# Patient Record
Sex: MALE | Race: WHITE | NOT HISPANIC OR LATINO | Employment: FULL TIME | URBAN - METROPOLITAN AREA
[De-identification: names, ages, dates, MRNs, and addresses within clinical notes are randomized per-mention and may not be internally consistent; named-entity substitution may affect disease eponyms.]

---

## 2018-05-08 ENCOUNTER — OFFICE VISIT CONVERTED (OUTPATIENT)
Dept: FAMILY MEDICINE CLINIC | Facility: CLINIC | Age: 38
End: 2018-05-08
Attending: NURSE PRACTITIONER

## 2018-06-22 ENCOUNTER — OFFICE VISIT CONVERTED (OUTPATIENT)
Dept: FAMILY MEDICINE CLINIC | Facility: CLINIC | Age: 38
End: 2018-06-22
Attending: PHYSICIAN ASSISTANT

## 2018-07-09 ENCOUNTER — CONVERSION ENCOUNTER (OUTPATIENT)
Dept: FAMILY MEDICINE CLINIC | Facility: CLINIC | Age: 38
End: 2018-07-09

## 2018-07-09 ENCOUNTER — OFFICE VISIT CONVERTED (OUTPATIENT)
Dept: FAMILY MEDICINE CLINIC | Facility: CLINIC | Age: 38
End: 2018-07-09
Attending: NURSE PRACTITIONER

## 2019-03-19 ENCOUNTER — OFFICE VISIT CONVERTED (OUTPATIENT)
Dept: FAMILY MEDICINE CLINIC | Facility: CLINIC | Age: 39
End: 2019-03-19
Attending: NURSE PRACTITIONER

## 2019-03-19 ENCOUNTER — CONVERSION ENCOUNTER (OUTPATIENT)
Dept: FAMILY MEDICINE CLINIC | Facility: CLINIC | Age: 39
End: 2019-03-19

## 2019-06-17 ENCOUNTER — OFFICE VISIT CONVERTED (OUTPATIENT)
Dept: FAMILY MEDICINE CLINIC | Facility: CLINIC | Age: 39
End: 2019-06-17
Attending: NURSE PRACTITIONER

## 2019-06-17 ENCOUNTER — HOSPITAL ENCOUNTER (OUTPATIENT)
Dept: LAB | Facility: HOSPITAL | Age: 39
Discharge: HOME | End: 2019-06-17
Attending: NURSE PRACTITIONER

## 2019-06-17 LAB
ALBUMIN SERPL-MCNC: 4.8 G/DL (ref 3.5–5)
ALBUMIN/GLOB SERPL: 1.6 {RATIO} (ref 1.4–2.6)
ALP SERPL-CCNC: 80 U/L (ref 53–128)
ALT SERPL-CCNC: 15 U/L (ref 10–40)
ANION GAP SERPL CALC-SCNC: 23 MMOL/L (ref 8–19)
AST SERPL-CCNC: 18 U/L (ref 15–50)
BASOPHILS # BLD AUTO: 0.07 10*3/UL (ref 0–0.2)
BASOPHILS NFR BLD AUTO: 0.8 % (ref 0–3)
BILIRUB SERPL-MCNC: 0.44 MG/DL (ref 0.2–1.3)
BUN SERPL-MCNC: 18 MG/DL (ref 5–25)
BUN/CREAT SERPL: 15 {RATIO} (ref 6–20)
CALCIUM SERPL-MCNC: 10 MG/DL (ref 8.7–10.4)
CHLORIDE SERPL-SCNC: 104 MMOL/L (ref 99–111)
CHOLEST SERPL-MCNC: 191 MG/DL (ref 107–200)
CHOLEST/HDLC SERPL: 5.2 {RATIO} (ref 3–6)
CONV ABS IMM GRAN: 0.02 10*3/UL (ref 0–0.2)
CONV CO2: 22 MMOL/L (ref 22–32)
CONV IMMATURE GRAN: 0.2 % (ref 0–1.8)
CONV TOTAL PROTEIN: 7.8 G/DL (ref 6.3–8.2)
CREAT UR-MCNC: 1.22 MG/DL (ref 0.7–1.2)
DEPRECATED RDW RBC AUTO: 45.9 FL (ref 35.1–43.9)
EOSINOPHIL # BLD AUTO: 0.81 10*3/UL (ref 0–0.7)
EOSINOPHIL # BLD AUTO: 8.9 % (ref 0–7)
ERYTHROCYTE [DISTWIDTH] IN BLOOD BY AUTOMATED COUNT: 13.6 % (ref 11.6–14.4)
GFR SERPLBLD BASED ON 1.73 SQ M-ARVRAT: >60 ML/MIN/{1.73_M2}
GLOBULIN UR ELPH-MCNC: 3 G/DL (ref 2–3.5)
GLUCOSE SERPL-MCNC: 94 MG/DL (ref 70–99)
HBA1C MFR BLD: 15.8 G/DL (ref 14–18)
HCT VFR BLD AUTO: 48.5 % (ref 42–52)
HDLC SERPL-MCNC: 37 MG/DL (ref 40–60)
LDLC SERPL CALC-MCNC: 136 MG/DL (ref 70–100)
LYMPHOCYTES # BLD AUTO: 2 10*3/UL (ref 1–5)
MCH RBC QN AUTO: 29.8 PG (ref 27–31)
MCHC RBC AUTO-ENTMCNC: 32.6 G/DL (ref 33–37)
MCV RBC AUTO: 91.3 FL (ref 80–96)
MONOCYTES # BLD AUTO: 0.7 10*3/UL (ref 0.2–1.2)
MONOCYTES NFR BLD AUTO: 7.7 % (ref 3–10)
NEUTROPHILS # BLD AUTO: 5.5 10*3/UL (ref 2–8)
NEUTROPHILS NFR BLD AUTO: 60.4 % (ref 30–85)
NRBC CBCN: 0 % (ref 0–0.7)
OSMOLALITY SERPL CALC.SUM OF ELEC: 300 MOSM/KG (ref 273–304)
PLATELET # BLD AUTO: 275 10*3/UL (ref 130–400)
PMV BLD AUTO: 10.9 FL (ref 9.4–12.4)
POTASSIUM SERPL-SCNC: 4.8 MMOL/L (ref 3.5–5.3)
PSA SERPL-MCNC: 0.57 NG/ML (ref 0–4)
RBC # BLD AUTO: 5.31 10*6/UL (ref 4.7–6.1)
SODIUM SERPL-SCNC: 144 MMOL/L (ref 135–147)
TESTOST SERPL-MCNC: 394 NG/DL (ref 249–836)
TRIGL SERPL-MCNC: 91 MG/DL (ref 40–150)
VARIANT LYMPHS NFR BLD MANUAL: 22 % (ref 20–45)
VLDLC SERPL-MCNC: 18 MG/DL (ref 5–37)
WBC # BLD AUTO: 9.1 10*3/UL (ref 4.8–10.8)

## 2019-06-19 LAB — TESTOSTERONE, FREE: 4.1 PG/ML (ref 8.7–25.1)

## 2020-01-23 ENCOUNTER — OFFICE VISIT CONVERTED (OUTPATIENT)
Dept: FAMILY MEDICINE CLINIC | Facility: CLINIC | Age: 40
End: 2020-01-23
Attending: NURSE PRACTITIONER

## 2020-01-23 ENCOUNTER — HOSPITAL ENCOUNTER (OUTPATIENT)
Dept: LAB | Facility: HOSPITAL | Age: 40
Discharge: HOME | End: 2020-01-23
Attending: NURSE PRACTITIONER

## 2020-01-23 ENCOUNTER — HOSPITAL ENCOUNTER (OUTPATIENT)
Dept: GENERAL RADIOLOGY | Facility: HOSPITAL | Age: 40
Discharge: HOME | End: 2020-01-23
Attending: NURSE PRACTITIONER

## 2020-01-23 LAB
ALBUMIN SERPL-MCNC: 4.8 G/DL (ref 3.5–5)
ALBUMIN/GLOB SERPL: 1.7 {RATIO} (ref 1.4–2.6)
ALP SERPL-CCNC: 77 U/L (ref 53–128)
ALT SERPL-CCNC: 20 U/L (ref 10–40)
ANION GAP SERPL CALC-SCNC: 21 MMOL/L (ref 8–19)
AST SERPL-CCNC: 21 U/L (ref 15–50)
BASOPHILS # BLD AUTO: 0.09 10*3/UL (ref 0–0.2)
BASOPHILS NFR BLD AUTO: 0.7 % (ref 0–3)
BILIRUB SERPL-MCNC: 0.7 MG/DL (ref 0.2–1.3)
BUN SERPL-MCNC: 15 MG/DL (ref 5–25)
BUN/CREAT SERPL: 16 {RATIO} (ref 6–20)
CALCIUM SERPL-MCNC: 10.1 MG/DL (ref 8.7–10.4)
CHLORIDE SERPL-SCNC: 97 MMOL/L (ref 99–111)
CHOLEST SERPL-MCNC: 196 MG/DL (ref 107–200)
CHOLEST/HDLC SERPL: 5 {RATIO} (ref 3–6)
CONV ABS IMM GRAN: 0.04 10*3/UL (ref 0–0.2)
CONV CO2: 25 MMOL/L (ref 22–32)
CONV IMMATURE GRAN: 0.3 % (ref 0–1.8)
CONV TOTAL PROTEIN: 7.7 G/DL (ref 6.3–8.2)
CREAT UR-MCNC: 0.96 MG/DL (ref 0.7–1.2)
DEPRECATED RDW RBC AUTO: 42.9 FL (ref 35.1–43.9)
EOSINOPHIL # BLD AUTO: 0.71 10*3/UL (ref 0–0.7)
EOSINOPHIL # BLD AUTO: 5.8 % (ref 0–7)
ERYTHROCYTE [DISTWIDTH] IN BLOOD BY AUTOMATED COUNT: 12.9 % (ref 11.6–14.4)
FERRITIN SERPL-MCNC: 114 NG/ML (ref 30–300)
FOLATE SERPL-MCNC: 12.1 NG/ML (ref 4.8–20)
GFR SERPLBLD BASED ON 1.73 SQ M-ARVRAT: >60 ML/MIN/{1.73_M2}
GLOBULIN UR ELPH-MCNC: 2.9 G/DL (ref 2–3.5)
GLUCOSE SERPL-MCNC: 82 MG/DL (ref 70–99)
HCT VFR BLD AUTO: 49 % (ref 42–52)
HDLC SERPL-MCNC: 39 MG/DL (ref 40–60)
HGB BLD-MCNC: 15.8 G/DL (ref 14–18)
IRON SATN MFR SERPL: 36 % (ref 20–55)
IRON SERPL-MCNC: 129 UG/DL (ref 70–180)
LDLC SERPL CALC-MCNC: 137 MG/DL (ref 70–100)
LYMPHOCYTES # BLD AUTO: 2.55 10*3/UL (ref 1–5)
LYMPHOCYTES NFR BLD AUTO: 20.7 % (ref 20–45)
MCH RBC QN AUTO: 29.3 PG (ref 27–31)
MCHC RBC AUTO-ENTMCNC: 32.2 G/DL (ref 33–37)
MCV RBC AUTO: 90.7 FL (ref 80–96)
MONOCYTES # BLD AUTO: 0.96 10*3/UL (ref 0.2–1.2)
MONOCYTES NFR BLD AUTO: 7.8 % (ref 3–10)
NEUTROPHILS # BLD AUTO: 7.97 10*3/UL (ref 2–8)
NEUTROPHILS NFR BLD AUTO: 64.7 % (ref 30–85)
NRBC CBCN: 0 % (ref 0–0.7)
OSMOLALITY SERPL CALC.SUM OF ELEC: 286 MOSM/KG (ref 273–304)
PLATELET # BLD AUTO: 321 10*3/UL (ref 130–400)
PMV BLD AUTO: 10.6 FL (ref 9.4–12.4)
POTASSIUM SERPL-SCNC: 4.9 MMOL/L (ref 3.5–5.3)
RBC # BLD AUTO: 5.4 10*6/UL (ref 4.7–6.1)
SODIUM SERPL-SCNC: 138 MMOL/L (ref 135–147)
T4 FREE SERPL-MCNC: 1.3 NG/DL (ref 0.9–1.8)
TIBC SERPL-MCNC: 355 UG/DL (ref 245–450)
TRANSFERRIN SERPL-MCNC: 248 MG/DL (ref 215–365)
TRIGL SERPL-MCNC: 102 MG/DL (ref 40–150)
TSH SERPL-ACNC: 1.05 M[IU]/L (ref 0.27–4.2)
VIT B12 SERPL-MCNC: 727 PG/ML (ref 211–911)
VLDLC SERPL-MCNC: 20 MG/DL (ref 5–37)
WBC # BLD AUTO: 12.32 10*3/UL (ref 4.8–10.8)

## 2020-01-31 ENCOUNTER — OFFICE VISIT CONVERTED (OUTPATIENT)
Dept: FAMILY MEDICINE CLINIC | Facility: CLINIC | Age: 40
End: 2020-01-31
Attending: NURSE PRACTITIONER

## 2020-10-28 ENCOUNTER — OFFICE VISIT CONVERTED (OUTPATIENT)
Dept: PODIATRY | Facility: CLINIC | Age: 40
End: 2020-10-28
Attending: PODIATRIST

## 2020-10-28 ENCOUNTER — HOSPITAL ENCOUNTER (OUTPATIENT)
Dept: GENERAL RADIOLOGY | Facility: HOSPITAL | Age: 40
Discharge: HOME | End: 2020-10-28
Attending: PODIATRIST

## 2020-11-02 ENCOUNTER — HOSPITAL ENCOUNTER (OUTPATIENT)
Dept: PREADMISSION TESTING | Facility: HOSPITAL | Age: 40
Discharge: HOME | End: 2020-11-02
Attending: PODIATRIST

## 2020-11-03 LAB — SARS-COV-2 RNA SPEC QL NAA+PROBE: NOT DETECTED

## 2020-11-06 ENCOUNTER — HOSPITAL ENCOUNTER (OUTPATIENT)
Dept: PERIOP | Facility: HOSPITAL | Age: 40
Setting detail: HOSPITAL OUTPATIENT SURGERY
Discharge: HOME | End: 2020-11-06
Attending: PODIATRIST

## 2020-11-10 ENCOUNTER — OFFICE VISIT CONVERTED (OUTPATIENT)
Dept: PODIATRY | Facility: CLINIC | Age: 40
End: 2020-11-10
Attending: PODIATRIST

## 2020-11-10 ENCOUNTER — HOSPITAL ENCOUNTER (OUTPATIENT)
Dept: GENERAL RADIOLOGY | Facility: HOSPITAL | Age: 40
Discharge: HOME | End: 2020-11-10
Attending: PODIATRIST

## 2020-11-17 ENCOUNTER — OFFICE VISIT CONVERTED (OUTPATIENT)
Dept: PODIATRY | Facility: CLINIC | Age: 40
End: 2020-11-17
Attending: PODIATRIST

## 2021-02-03 ENCOUNTER — HOSPITAL ENCOUNTER (OUTPATIENT)
Dept: URGENT CARE | Facility: CLINIC | Age: 41
Discharge: HOME | End: 2021-02-03
Attending: FAMILY MEDICINE

## 2021-02-04 LAB — SARS-COV-2 RNA SPEC QL NAA+PROBE: NOT DETECTED

## 2021-05-10 NOTE — H&P
"   History and Physical      Patient Name: Asad Sutton   Patient ID: [id number]   Sex: Male   YOB: 1980    Primary Care Provider: Lalitha LYLES   Referring Provider: Lalitha LYLES    Visit Date: October 28, 2020    Provider: Lino Brownlee DPM   Location: [facility]   Location Address: [address] [place] [address]  [id number]   Location Phone: [phone]          Chief Complaint  · Left Foot Pain  · Surgical History and Physical      History Of Present Illness  Asad Sutton is a 40 year old /White male who presents to the [facility] today new patient referred from Lalitha LYLES.   Patient Name: Asad Sutton   Allergies: PENICILLINS   Cheif Complaint/HPI: Left bunion deformity of great toe.   Current Medicaitons: no medications   Past Medical History: Aftercare following surgery, Anxiety, Athletes foot, Bunion of left foot, Corns and callus, Depression, Foot pain, left, Hammertoe, Heel pain, Hypertension, Ingrown toenail, Numbness in feet, and Plantar wart   Relevent Social History: Smoker   Tobacco Use: Currently is a smoker : discussed with patient smoking cessation and risk of delayed healing with continued smoking habits.   Psychological History: Within Normal Limits   HPI     5 years  Onset: Insidious  Nature: Sore, achy  Stable, worsening, improving: Slowly worsening  Aggravating factors:  Patient relates pain is aggravated by shoe gear and ambulation.    Previous Treatment: Changes in shoe gear and activities.  Patient was scheduled for a left bunion correction with the [facility] but due to them perform the surgery in [place] the patient presents to my office for evaluation of possible surgery of his left bunion.    Patient denies any fevers, chills, nausea, vomiting, shortness of breathe, nor any other constitutional signs nor symptoms.    Patient states he works as a  from [employer]\">New, " Established, New Problem: New  Location: Left bunion deformity  Duration:  > 5 years  Onset: Insidious  Nature: Sore, achy  Stable, worsening, improving: Slowly worsening  Aggravating factors:  Patient relates pain is aggravated by shoe gear and ambulation.    Previous Treatment: Changes in shoe gear and activities.  Patient was scheduled for a left bunion correction with the [facility] but due to them perform the surgery in [place] the patient presents to my office for evaluation of possible surgery of his left bunion.    Patient denies any fevers, chills, nausea, vomiting, shortness of breathe, nor any other constitutional signs nor symptoms.    Patient states he works as a  from [employer].       Past Medical History  Aftercare following surgery; Anxiety; Athletes foot; Bunion of left foot; Corns and callus; Depression; Foot pain, left; Hammertoe; Heel pain; Hypertension; Ingrown toenail; Numbness in feet; Plantar wart         Past Surgical History  *No Past Surgical History         Allergy List  PENICILLINS       Allergies Reconciled  Family Medical History  Breast Neoplasm, Malignant; Hypertension; Diabetes Mellitus, Type II         Social History  Alcohol (Never); Heavy Amount of Exercise (4 or more times weekly); No known infection risk; Single; Tobacco (Current every day)         Immunizations  Name Date Admin   Tdap 10/24/2017         Review of Systems  · Constitutional  o Denies  o : fatigue, night sweats  · Eyes  o Denies  o : double vision, blurred vision  · HENT  o Denies  o : vertigo, recent head injury  · Cardiovascular  o Denies  o : chest pain, irregular heart beats  · Respiratory  o Denies  o : shortness of breath, productive cough  · Gastrointestinal  o Denies  o : nausea, vomiting  · Genitourinary  o Denies  o : dysuria, urinary retention  · Integument  o Denies  o : hair growth change, new skin lesions  · Neurologic  o Denies  o : altered mental status,  "seizures  · Musculoskeletal  o * See HPI  · Endocrine  o Denies  o : cold intolerance, heat intolerance  · Heme-Lymph  o Denies  o : petechiae, lymph node enlargement or tenderness  · Allergic-Immunologic  o Denies  o : frequent illnesses      Vitals  Date Time BP Position Site L\R Cuff Size HR RR TEMP (F) WT  HT  BMI kg/m2 BSA m2 O2 Sat FR L/min FiO2 HC       10/28/2020 10:15 /87 Sitting    67 - R  97.8 211lbs 6oz 6'  6\" 24.43 2.3 100 %            Physical Examination  · Constitutional  o Appearance  o : well developed, well-nourished, no obvious deformities present  · Cardiovascular  o Peripheral Vascular System  o :   § Pedal Pulses  § : pulses 2 + and symmetrical  § Extremities  § : no edema in lower extremities  · Musculoskeletal  o General  o :   § General Musculoskeletal  § : Lower extremity muscle and strength and range of motion is equal and symmetrical bilaterally. The knees are noted to be normal in alignment. Left medial deviation of the first metatarsal with associated lateral deviation of the hallux at the metatarsal phalangeal joint. The right first metatarsophalangeal joint is painful with range of motion. No signs of edema, erythema, lymphangitis, nor signs of infection.   · Skin and Subcutaneous Tissue  o General Inspection  o : Skin is noted to have normal texture and turgor, with no excrescences noted.   o Digits and Nails  o : The toenails are noted to be without disese.  · Neurologic  o Sensation  o : Epicritic sensations intact bilaterally.     Reviewed charts from [facility].           Assessment  · Foot pain, left     729.5/M79.672  · Hallux abducto valgus, left     735.0/M20.12  · Preoperative examination     V72.84/Z01.818      Plan  · Orders  o AMIE Report (KASPR) - - 10/28/2020  o Foot (Left) 3 or more views X-Ray [facility] Preferred View ([id number]) - - 10/28/2020  o [facility] Pre-Op Covid-19 Screening (82710) - - 10/28/2020  o Harjeet Mosqueda ([id number]) - - " 10/28/2020  o [place] bunionectomy of left great toe with insertion of screw (68329) - - 10/28/2020  · Medications  o Medications have been Reconciled  o Transition of Care or Provider Policy  · Instructions  o PLAN: Corry Morse bunionectomy and modified Harjeet bunionectomy.  o Handouts Provided-Pre-Procedure Instructions including date and time and location of procedure.  o Surgical Facility: [facility]  o ****Surgical Orders****  o ****Patient Status****  o Outpatient  o ********************  o RISK AND BENEFITS:  o Consent for surgery: Given these options, the patient has verbally expressed an understanding of the risks of surgery and finds these risks acceptable. We will proceed with surgery as soon as possible.  o Consult Anesthesia for an post-operative block, or any pain management procedure deemed necessary by the anestesiologist for adequate post-operative pain control.   o O.R. PREP: Per protocol  o IV: Per Anesthesia  o IV: LR@ 75ml/hr  o PLEASE SIGN PERMIT FOR: Left great toe bunion correction  o *******************************************  o PRE- OP MEDICATION ORDER:  o *******************************************  o *__Clindamycin 600 mg IV on call to OR.  o *___The above History and Physical Examination has been completed within 30 days of admission.  o Pre-Admission Testing Date:   o Follow up post surgery in 4 days.  o Discuss Findings: I have discussed the findings of this evaluation with the patient. The discussion included a complete verbal explanation of any changes in the examination results, diagnosis, and the current treatment plan. A schedule for future care needs was explained. If any questions should arise after returning home, I have encouraged the patient to feel free to contact Dr. Brownlee. The patient states understanding and agreement with this plan.  o Advised smoking cessation and discussed techniques to quit (patch, pill, etc), (3 - 10 minutes). I encouraged  the patient to discuss steps to begin process with their primary care provider.  o Procedure: Left Reverdin Green [place] bunionectomy and modified [place] bunionectomy. Upon discussion of non-surgical conservative option, surgical correction, post-operative requirements along with risk and benefits of the surgery along with expected outcomes, the patient states they would like to proceed with the scheduling surgery.  o Patient to monitor for recurrence of symptoms and to contact Dr. Abreu office for a follow-up appointment.  o The risks and benefits of the surgery were discussed with the patient. This discussion included possible complications of requiring further surgery, possible delayed wound healing, further surgery requiring amputation of the foot or leg, and also included the complication of death. All the patient's questions were answered to their satisfaction. Patient states they would like to proceed with the procedure.   o Local with MAC. Supine. [place] 28 screw set.   o Electronically Identified Patient Education Materials Provided Electronically  · Disposition  o Call or Return if symptoms worsen or persist.            Electronically Signed by: Lino Brownlee DPM -Author on October 28, 2020 11:25:19 AM

## 2021-05-13 NOTE — PROGRESS NOTES
Progress Note      Patient Name: Asad Sutton   Patient ID: [id number]   Sex: Male   YOB: 1980    Primary Care Provider: Lalitha LYLES   Referring Provider: Lalitha LYLES    Visit Date: November 10, 2020    Provider: Lino Brownlee DPM   Location: [facility] [place] [place] [facility]   Location Address: [address] [place] [address]  [id number]   Location Phone: [phone]          Chief Complaint  · Follow Up Office Visit S/P Surgery      History Of Present Illness  Asad Sutton is a 40 year old /White male who presents today for a postoperative visit.      Procedure:  Left Reverdin Green [place] and modified Cosby bunionectomies  Date:  06 October 2020      Patient states he tripped at home and fell and landed on his surgical foot.  Otherwise, patient states they are doing well without complications.  Patient states they are following post-op instructions.  Patient states pain is controlled.      Patient denies any fevers, chills, nausea, vomiting, shortness of breathe, nor any other constitutional signs nor symptoms.                 Past Medical History  Aftercare following surgery; Anxiety; Athletes foot; Bunion of left foot; Corns and callus; Depression; Foot pain, left; Hammertoe; Heel pain; Hypertension; Ingrown toenail; Numbness in feet; Plantar wart         Past Surgical History  Bunionectomy         Allergy List  PENICILLINS       Allergies Reconciled  Family Medical History  Breast Neoplasm, Malignant; Hypertension; Diabetes Mellitus, Type II         Social History  Alcohol (Never); Heavy Amount of Exercise (4 or more times weekly); No known infection risk; Single; Tobacco (Current every day)         Immunizations  Name Date Admin   Tdap 10/24/2017         Review of Systems  · Constitutional  o Denies  o : fatigue, night sweats  · Eyes  o Denies  o : double vision, blurred vision  · HENT  o Denies  o : vertigo, recent head injury  · Cardiovascular  o Denies  o : chest  "pain, irregular heart beats  · Respiratory  o Denies  o : shortness of breath, productive cough  · Gastrointestinal  o Denies  o : nausea, vomiting  · Genitourinary  o Denies  o : dysuria, urinary retention  · Integument  o * See HPI  · Neurologic  o Denies  o : altered mental status, seizures  · Musculoskeletal  o * See HPI  · Endocrine  o Denies  o : cold intolerance, heat intolerance  · Heme-Lymph  o Denies  o : petechiae, lymph node enlargement or tenderness  · Allergic-Immunologic  o Denies  o : frequent illnesses      Vitals  Date Time BP Position Site L\R Cuff Size HR RR TEMP (F) WT  HT  BMI kg/m2 BSA m2 O2 Sat FR L/min FiO2 HC       11/10/2020 10:12 /80 Sitting                 11/10/2020 10:12 /94 Sitting    77 - R  97.6  6'  6\"   100 %            Physical Examination  · Constitutional  o Appearance  o : well developed, well-nourished, no obvious deformities present  · Cardiovascular  o Peripheral Vascular System  o :   § Pedal Pulses  § : pulses 2 + and symmetrical  § Extremities  § : no edema in lower extremities  · Skin and Subcutaneous Tissue  o General Inspection  o : Skin is noted to have normal texture and turgor, with no excrescences noted.   o Digits and Nails  o : The toenails are noted to be without disese.  · Neurologic  o Sensation  o : Epicritic sensations intact bilaterally.  · Left Ankle/Foot  o Inspection  o : Dressing is dry and intact without signs of breakthrough. Sutures intact with skin edges well-coapted with no signs of dehiscence. Healthy surgical skin edges. No drainage present. No edema, erythema, calor, lymphangitis, nor signs of infection seen. Left great toe is in rectus position.     Dr. Brownlee reviewed radiographs and results from [facility] and discussed them with the patient.  Radiographs show distal 1st metatarsal shaft osteotomy which shows a bunionectomy with good post-operative position with single screw fixation. No displacement seen of original " postop position of the capital fragment on the left first metatarsal shaft.  No osteolytic changes seen surrounding screw placement. No other changes seen compared to previous views.           Assessment  · Postoperative Exam Following Surgery     V67.00      Plan  · Medications  o Medications have been Reconciled  o Transition of Care or Provider Policy  · Instructions  o Patient not weight bear to the affected foot at this time. Patient states understanding and agreement with this plan.  o Patient to monitor for recurrence of symptoms and to contact Dr. Abreu office for a follow-up appointment.  o 2nd Post-op visit, X-rays, Suture Removal, PWB with surgical shoe.  o Electronically Identified Patient Education Materials Provided Electronically  · Disposition  o Call or Return if symptoms worsen or persist.            Electronically Signed by: Lino Brownlee DPM -Author on November 10, 2020 10:30:30 AM

## 2021-05-13 NOTE — PROGRESS NOTES
Progress Note      Patient Name: Asad Sutton   Patient ID: [id number]   Sex: Male   YOB: 1980    Primary Care Provider: Lalitha LYLES   Referring Provider: Lalitha LYLES    Visit Date: November 17, 2020    Provider: Lino Brownlee DPM   Location: [facility]   Location Address: [address] [place] [address]  [id number]   Location Phone: [phone]          Chief Complaint  · Follow Up Office Visit S/P Surgery      History Of Present Illness  Asad Sutton is a 40 year old /White male who presents today for a postoperative visit.      Procedure:  Left Reverdin Green [place] and modified Cosby bunionectomies  Date:  06 October 2020      Patient states they are doing well without complications.  Patient states they are following post-op instructions.  Patient states pain is controlled.      Patient denies any fevers, chills, nausea, vomiting, shortness of breathe, nor any other constitutional signs nor symptoms.      Patient relates no medical changes since their last visit.           Past Medical History  Aftercare following surgery; Anxiety; Athletes foot; Bunion of left foot; Corns and callus; Depression; Foot pain, left; Hammertoe; Heel pain; Hypertension; Ingrown toenail; Numbness in feet; Plantar wart         Past Surgical History  Bunionectomy         Allergy List  PENICILLINS       Allergies Reconciled  Family Medical History  Breast Neoplasm, Malignant; Hypertension; Diabetes Mellitus, Type II         Social History  Alcohol (Never); Heavy Amount of Exercise (4 or more times weekly); No known infection risk; Single; Tobacco (Current every day)         Immunizations  Name Date Admin   Tdap 10/24/2017         Review of Systems  · Constitutional  o Denies  o : fatigue, night sweats  · Eyes  o Denies  o : double vision, blurred vision  · HENT  o Denies  o : vertigo, recent head injury  · Cardiovascular  o Denies  o : chest pain, irregular heart  "beats  · Respiratory  o Denies  o : shortness of breath, productive cough  · Gastrointestinal  o Denies  o : nausea, vomiting  · Genitourinary  o Denies  o : dysuria, urinary retention  · Integument  o * See HPI  · Neurologic  o Denies  o : altered mental status, seizures  · Musculoskeletal  o * See HPI  · Endocrine  o Denies  o : cold intolerance, heat intolerance  · Heme-Lymph  o Denies  o : petechiae, lymph node enlargement or tenderness  · Allergic-Immunologic  o Denies  o : frequent illnesses      Vitals  Date Time BP Position Site L\R Cuff Size HR RR TEMP (F) WT  HT  BMI kg/m2 BSA m2 O2 Sat FR L/min FiO2        11/17/2020 09:54 /78 Sitting    84 - R  97.6  6'  6\"   100 %            Physical Examination  · Constitutional  o Appearance  o : well developed, well-nourished, no obvious deformities present  · Cardiovascular  o Peripheral Vascular System  o :   § Pedal Pulses  § : pulses 2 + and symmetrical  § Extremities  § : no edema in lower extremities  · Skin and Subcutaneous Tissue  o General Inspection  o : Skin is noted to have normal texture and turgor, with no excrescences noted.   o Digits and Nails  o : The toenails are noted to be without disese.  · Neurologic  o Sensation  o : Epicritic sensations intact bilaterally.  · Left Ankle/Foot  o Inspection  o : Dressing is dry and intact without signs of breakthrough. Sutures intact with skin edges well-coapted with no signs of dehiscence. Healthy surgical skin edges. No drainage present. No edema, erythema, calor, lymphangitis, nor signs of infection seen. Left great toe is in rectus position. Upon removal of sutures, skin edges remain well-coapted with no signs of dehiscence.     Dr. Brownlee reviewed radiographs and results from [facility] and discussed them with the patient.  Radiographs show distal 1st metatarsal shaft osteotomy which shows a bunionectomy with good post-operative position with single screw fixation.  No osteolytic " changes seen surrounding screw placement.  Early trabeculation seen across osteotomy site.   No other changes seen compared to previous views.           Assessment  · Postoperative Exam Following Surgery     V67.00      Plan  · Medications  o Medications have been Reconciled  o Transition of Care or Provider Policy  · Instructions  o Patient not weight bear to the affected foot at this time. Patient states understanding and agreement with this plan.  o Patient to monitor for recurrence of symptoms and to contact Dr. Abreu office for a follow-up appointment.  o Third post-op visit, X-rays, transition to regular shoe gear as tolerated, no impact activities x1 month.  o Transition to partial weightbearing with surgical shoe as tolerated. No impact activities. The patient states understanding and agreement with this plan.   o Electronically Identified Patient Education Materials Provided Electronically  · Disposition  o Call or Return if symptoms worsen or persist.            Electronically Signed by: Lino Brownlee DPM -Author on November 17, 2020 10:14:53 AM

## 2021-05-14 VITALS
WEIGHT: 211.37 LBS | BODY MASS INDEX: 24.46 KG/M2 | HEIGHT: 78 IN | SYSTOLIC BLOOD PRESSURE: 132 MMHG | TEMPERATURE: 97.8 F | DIASTOLIC BLOOD PRESSURE: 87 MMHG | OXYGEN SATURATION: 100 % | HEART RATE: 67 BPM

## 2021-05-14 VITALS
TEMPERATURE: 97.6 F | SYSTOLIC BLOOD PRESSURE: 126 MMHG | HEART RATE: 84 BPM | DIASTOLIC BLOOD PRESSURE: 78 MMHG | HEIGHT: 78 IN | OXYGEN SATURATION: 100 %

## 2021-05-14 VITALS
HEIGHT: 78 IN | SYSTOLIC BLOOD PRESSURE: 144 MMHG | DIASTOLIC BLOOD PRESSURE: 94 MMHG | OXYGEN SATURATION: 100 % | HEART RATE: 77 BPM | TEMPERATURE: 97.6 F

## 2021-05-15 VITALS
HEART RATE: 68 BPM | BODY MASS INDEX: 23.4 KG/M2 | SYSTOLIC BLOOD PRESSURE: 110 MMHG | DIASTOLIC BLOOD PRESSURE: 73 MMHG | HEIGHT: 78 IN | WEIGHT: 202.25 LBS

## 2021-05-15 VITALS
HEART RATE: 68 BPM | SYSTOLIC BLOOD PRESSURE: 106 MMHG | DIASTOLIC BLOOD PRESSURE: 68 MMHG | WEIGHT: 205 LBS | BODY MASS INDEX: 23.72 KG/M2 | HEIGHT: 78 IN

## 2021-05-15 VITALS
OXYGEN SATURATION: 99 % | SYSTOLIC BLOOD PRESSURE: 110 MMHG | HEART RATE: 61 BPM | HEIGHT: 78 IN | BODY MASS INDEX: 23.95 KG/M2 | WEIGHT: 207 LBS | DIASTOLIC BLOOD PRESSURE: 60 MMHG

## 2021-05-15 VITALS
HEIGHT: 78 IN | DIASTOLIC BLOOD PRESSURE: 84 MMHG | BODY MASS INDEX: 23.76 KG/M2 | SYSTOLIC BLOOD PRESSURE: 132 MMHG | HEART RATE: 88 BPM | WEIGHT: 205.37 LBS

## 2021-05-16 VITALS
HEART RATE: 67 BPM | HEIGHT: 78 IN | WEIGHT: 198.12 LBS | BODY MASS INDEX: 22.92 KG/M2 | SYSTOLIC BLOOD PRESSURE: 121 MMHG | DIASTOLIC BLOOD PRESSURE: 76 MMHG

## 2021-05-16 VITALS
OXYGEN SATURATION: 99 % | HEART RATE: 59 BPM | WEIGHT: 208.12 LBS | BODY MASS INDEX: 24.08 KG/M2 | DIASTOLIC BLOOD PRESSURE: 78 MMHG | SYSTOLIC BLOOD PRESSURE: 125 MMHG | HEIGHT: 78 IN

## 2021-05-16 VITALS
HEIGHT: 78 IN | DIASTOLIC BLOOD PRESSURE: 70 MMHG | SYSTOLIC BLOOD PRESSURE: 110 MMHG | HEART RATE: 76 BPM | OXYGEN SATURATION: 99 % | BODY MASS INDEX: 23.72 KG/M2 | WEIGHT: 205 LBS

## 2021-09-28 ENCOUNTER — OFFICE VISIT (OUTPATIENT)
Dept: PODIATRY | Facility: CLINIC | Age: 41
End: 2021-09-28

## 2021-09-28 VITALS
WEIGHT: 220 LBS | TEMPERATURE: 97.5 F | BODY MASS INDEX: 25.45 KG/M2 | HEART RATE: 59 BPM | HEIGHT: 78 IN | SYSTOLIC BLOOD PRESSURE: 110 MMHG | OXYGEN SATURATION: 100 % | DIASTOLIC BLOOD PRESSURE: 73 MMHG

## 2021-09-28 DIAGNOSIS — M79.672 FOOT PAIN, LEFT: Primary | ICD-10-CM

## 2021-09-28 DIAGNOSIS — G57.62 MORTON NEUROMA, LEFT: ICD-10-CM

## 2021-09-28 PROCEDURE — 99213 OFFICE O/P EST LOW 20 MIN: CPT | Performed by: PODIATRIST

## 2021-09-28 PROCEDURE — 64455 NJX AA&/STRD PLTR COM DG NRV: CPT | Performed by: PODIATRIST

## 2021-09-28 RX ORDER — LIDOCAINE HYDROCHLORIDE 10 MG/ML
0.5 INJECTION, SOLUTION INFILTRATION; PERINEURAL ONCE
Status: COMPLETED | OUTPATIENT
Start: 2021-09-28 | End: 2021-09-28

## 2021-09-28 RX ORDER — DEXAMETHASONE SODIUM PHOSPHATE 4 MG/ML
2 INJECTION, SOLUTION INTRA-ARTICULAR; INTRALESIONAL; INTRAMUSCULAR; INTRAVENOUS; SOFT TISSUE ONCE
Status: COMPLETED | OUTPATIENT
Start: 2021-09-28 | End: 2021-09-28

## 2021-09-28 RX ORDER — TRIAMCINOLONE ACETONIDE 40 MG/ML
20 INJECTION, SUSPENSION INTRA-ARTICULAR; INTRAMUSCULAR ONCE
Status: COMPLETED | OUTPATIENT
Start: 2021-09-28 | End: 2021-09-28

## 2021-09-28 RX ADMIN — LIDOCAINE HYDROCHLORIDE 0.5 ML: 10 INJECTION, SOLUTION INFILTRATION; PERINEURAL at 15:17

## 2021-09-28 RX ADMIN — DEXAMETHASONE SODIUM PHOSPHATE 2 MG: 4 INJECTION, SOLUTION INTRA-ARTICULAR; INTRALESIONAL; INTRAMUSCULAR; INTRAVENOUS; SOFT TISSUE at 15:16

## 2021-09-28 RX ADMIN — TRIAMCINOLONE ACETONIDE 20 MG: 40 INJECTION, SUSPENSION INTRA-ARTICULAR; INTRAMUSCULAR at 15:17

## 2021-09-28 NOTE — PROGRESS NOTES
[facility] - PODIATRY    Today's Date: 21    Patient Name: Asad Sutton  MRN: [id number]  CSN: [id number]  PCP: Lalitha Dougherty APRN  Referring Provider: No ref. provider found    SUBJECTIVE     Chief Complaint   Patient presents with   • Left Foot - Pain, Edema     HPI: Asad Sutton, a 41 y.o.male, comes to clinic.    New, Established, New Problem: New problem    Location: Plantar left second intermetatarsal space    Duration: 2021    Onset:  gradual    Nature:  achy, sharp, shooting    Stable, worsening, improving: Worsening    Aggravating factors:      Patient describes left foot pain as stabbing, burning, or aching. This pain is in the left second intermetatarsal space.  This is aggravated with shoe gear and ambulation.  on their feet, and again the next morning.      Previous Treatment: None    Patient denies any fevers, chills, nausea, vomiting, shortness of breath, nor any other constitutional signs nor symptoms.    No other pedal complaints at this time.    Past Medical History:   Diagnosis Date   • Foot pain, left      Past Surgical History:   Procedure Laterality Date   • BUNIONECTOMY      left2020   • HAND SURGERY     • VEIN SURGERY       Family History   Problem Relation Age of Onset   • Hypertension Father    • Hypertension Brother    • Diabetes Maternal Grandfather    • Diabetes Paternal Grandfather    • Cancer Neg Hx      Social History     Socioeconomic History   • Marital status:      Spouse name: Not on file   • Number of children: Not on file   • Years of education: Not on file   • Highest education level: Not on file   Tobacco Use   • Smoking status: Former Smoker     Quit date: 2021     Years since quittin.3   • Smokeless tobacco: Former User     Types: Chew   Vaping Use   • Vaping Use: Never used   Substance and Sexual Activity   • Alcohol use: Never   • Drug use: Yes     Types: Marijuana   • Sexual activity: Defer     Allergies   Allergen  Reactions   • Penicillins Unknown - Low Severity     No current outpatient medications on file.     Current Facility-Administered Medications   Medication Dose Route Frequency Provider Last Rate Last Admin   • dexamethasone sodium phosphate injection 2 mg  2 mg Intramuscular Once Lino Brownlee DPM       • lidocaine (XYLOCAINE) 1 % injection 0.5 mL  0.5 mL Infiltration Once Lino Brownlee DPM       • triamcinolone acetonide (KENALOG-40) injection 20 mg  20 mg Intramuscular Once Lino Brownlee DPM         Review of Systems   Constitutional: Negative.    Musculoskeletal:        Plantar left second intermetatarsal space   All other systems reviewed and are negative.      OBJECTIVE     Vitals:    09/28/21 1436   BP: 110/73   Pulse: 59   Temp: 97.5 °F (36.4 °C)   SpO2: 100%       PHYSICAL EXAM     Foot/Ankle Exam:       General:   Appearance: appears stated age and healthy    Orientation: AAOx3    Affect: appropriate    Gait: unimpaired    Shoe Gear:  Boots    VASCULAR      Right Foot Vascularity   Normal vascular exam    Dorsalis pedis:  2+  Posterior tibial:  2+  Skin Temperature: warm    Edema Grading:  None  CFT:  < 3 seconds  Pedal Hair Growth:  Present  Varicosities: none       Left Foot Vascularity   Normal vascular exam    Dorsalis pedis:  2+  Posterior tibial:  2+  Skin Temperature: warm    Edema Grading:  None  CFT:  < 3 seconds  Pedal Hair Growth:  Present  Varicosities: none        NEUROLOGIC     Right Foot Neurologic   Normal sensation    Light touch sensation:  Normal  Vibratory sensation:  Normal  Hot/Cold sensation: normal       Left Foot Neurologic   Normal sensation    Light touch sensation:  Normal  Vibratory sensation:  Normal  Hot/cold sensation: normal    Protective Sensation using Jenkins-Miriam Monofilament:  10     MUSCULOSKELETAL      Left Foot Musculoskeletal   Tenderness: neuroma and toe 2       MUSCLE STRENGTH     Right Foot Muscle Strength   Normal strength     Foot dorsiflexion:  5  Foot plantar flexion:  5  Foot inversion:  5  Foot eversion:  5     Left Foot Muscle Strength   Foot dorsiflexion:  5  Foot plantar flexion:  5  Foot inversion:  5  Foot eversion:  5     RANGE OF MOTION      Right Foot Range of Motion   Foot and ankle ROM within normal limits       Left Foot Range of Motion   Foot and ankle ROM within normal limits       DERMATOLOGIC     Right Foot Dermatologic   Skin: skin intact    Nails: normal       Left Foot Dermatologic   Skin: skin intact    Nails: normal        RADIOLOGY:    XR Foot 3+ View Left    Result Date: 9/28/2021  Narrative: IN-OFFICE IMAGING:  3 view, AP, MO, Lateral, left foot Indication: Painful left second intermetatarsal space Findings: Well-healed distal bunionectomy in the first metatarsal shaft with single screw placement.  No osteolytic changes seen around screw placement.  No periosteal reactions nor osteolytic changes seen.  No occult fractures seen. Comparison: No other changes seen compared to previous views.        ASSESSMENT/PLAN     Diagnoses and all orders for this visit:    1. Foot pain, left (Primary)    2. Burdick neuroma, left  -     triamcinolone acetonide (KENALOG-40) injection 20 mg  -     lidocaine (XYLOCAINE) 1 % injection 0.5 mL  -     dexamethasone sodium phosphate injection 2 mg      Comprehensive lower extremity examination and evaluation was performed.    Discussed findings and treatment plan including risks, benefits, and treatment options with patient in detail. Patient agreed with treatment plan.    Burdick's neuroma injection:    Date/Time: 09/28/2021  Performed by: Lino Brownlee DPM  Authorized by: Lino Brownlee DPM     Consent: Verbal consent obtained. Written consent obtained.  Risks and benefits: risks, benefits and alternatives were discussed  Consent given by: patient  Patient identity confirmed: verbally with patient  Indications: pain relief    Injection site: Left second intermetatarsal  "space.    Sedation:  none    Patient position: sitting  Needle size: 27 G, 1 1/4\" in length  Injection medications:  0.5 ml 1% Lidocaine plain, 0.5 ml Kenalog 40 mg/ml, and 0.5 ml Decadron 4 mg/mL.   Outcome: pain improved    Patient tolerated the procedure well with no immediate complications.    Discussed proper shoegear for the patient's feet and medical condition.  Patient states understanding and agreement with this plan.    Dr. Brownlee recommended purchase and use of OTC Spenco Orthotics.  The patient states understanding and agreement with this plan.    An After Visit Summary was printed and given to the patient at discharge, including (if requested) any available informative/educational handouts regarding diagnosis, treatment, or medications. All questions were answered to patient/family satisfaction. Should symptoms fail to improve or worsen they agree to call or return to clinic or to go to the Emergency Department. Discussed the importance of following up with any needed screening tests/labs/specialist appointments and any requested follow-up recommended by me today. Importance of maintaining follow-up discussed and patient accepts that missed appointments can delay diagnosis and potentially lead to worsening of conditions.    Return if symptoms worsen or fail to improve., or sooner if acute issues arise.    This document has been electronically signed by Lino Brownlee DPM on September 28, 2021 15:12 EDT       "

## 2021-11-23 ENCOUNTER — LAB (OUTPATIENT)
Dept: LAB | Facility: HOSPITAL | Age: 41
End: 2021-11-23

## 2021-11-23 ENCOUNTER — TELEPHONE (OUTPATIENT)
Dept: FAMILY MEDICINE CLINIC | Facility: CLINIC | Age: 41
End: 2021-11-23

## 2021-11-23 ENCOUNTER — HOSPITAL ENCOUNTER (OUTPATIENT)
Dept: GENERAL RADIOLOGY | Facility: HOSPITAL | Age: 41
Discharge: HOME | End: 2021-11-23

## 2021-11-23 ENCOUNTER — OFFICE VISIT (OUTPATIENT)
Dept: FAMILY MEDICINE CLINIC | Facility: CLINIC | Age: 41
End: 2021-11-23

## 2021-11-23 DIAGNOSIS — R06.02 SOB (SHORTNESS OF BREATH): ICD-10-CM

## 2021-11-23 DIAGNOSIS — R07.9 CHEST PAIN, UNSPECIFIED TYPE: ICD-10-CM

## 2021-11-23 DIAGNOSIS — Z82.49 FAMILY HISTORY OF HEART DISEASE: ICD-10-CM

## 2021-11-23 DIAGNOSIS — Z13.29 THYROID DISORDER SCREEN: ICD-10-CM

## 2021-11-23 DIAGNOSIS — Z13.220 LIPID SCREENING: ICD-10-CM

## 2021-11-23 DIAGNOSIS — Z87.891 HISTORY OF SMOKING: ICD-10-CM

## 2021-11-23 DIAGNOSIS — R07.9 CHEST PAIN, UNSPECIFIED TYPE: Primary | ICD-10-CM

## 2021-11-23 LAB
BASOPHILS # BLD AUTO: 0.06 10*3/MM3 (ref 0–0.2)
BASOPHILS NFR BLD AUTO: 0.6 % (ref 0–1.5)
DEPRECATED RDW RBC AUTO: 40.2 FL (ref 37–54)
EOSINOPHIL # BLD AUTO: 0.68 10*3/MM3 (ref 0–0.4)
EOSINOPHIL NFR BLD AUTO: 7 % (ref 0.3–6.2)
ERYTHROCYTE [DISTWIDTH] IN BLOOD BY AUTOMATED COUNT: 12.5 % (ref 12.3–15.4)
HCT VFR BLD AUTO: 44.5 % (ref 37.5–51)
HGB BLD-MCNC: 15.2 G/DL (ref 13–17.7)
IMM GRANULOCYTES # BLD AUTO: 0.02 10*3/MM3 (ref 0–0.05)
IMM GRANULOCYTES NFR BLD AUTO: 0.2 % (ref 0–0.5)
LYMPHOCYTES # BLD AUTO: 2.4 10*3/MM3 (ref 0.7–3.1)
LYMPHOCYTES NFR BLD AUTO: 24.8 % (ref 19.6–45.3)
MCH RBC QN AUTO: 30.3 PG (ref 26.6–33)
MCHC RBC AUTO-ENTMCNC: 34.2 G/DL (ref 31.5–35.7)
MCV RBC AUTO: 88.8 FL (ref 79–97)
MONOCYTES # BLD AUTO: 0.69 10*3/MM3 (ref 0.1–0.9)
MONOCYTES NFR BLD AUTO: 7.1 % (ref 5–12)
NEUTROPHILS NFR BLD AUTO: 5.83 10*3/MM3 (ref 1.7–7)
NEUTROPHILS NFR BLD AUTO: 60.3 % (ref 42.7–76)
NRBC BLD AUTO-RTO: 0 /100 WBC (ref 0–0.2)
PLATELET # BLD AUTO: 331 10*3/MM3 (ref 140–450)
PMV BLD AUTO: 10.8 FL (ref 6–12)
RBC # BLD AUTO: 5.01 10*6/MM3 (ref 4.14–5.8)
TSH SERPL DL<=0.05 MIU/L-ACNC: 1.12 UIU/ML (ref 0.27–4.2)
WBC NRBC COR # BLD: 9.68 10*3/MM3 (ref 3.4–10.8)

## 2021-11-23 PROCEDURE — 36415 COLL VENOUS BLD VENIPUNCTURE: CPT | Performed by: NURSE PRACTITIONER

## 2021-11-23 PROCEDURE — 99214 OFFICE O/P EST MOD 30 MIN: CPT | Performed by: NURSE PRACTITIONER

## 2021-11-23 PROCEDURE — 84443 ASSAY THYROID STIM HORMONE: CPT | Performed by: NURSE PRACTITIONER

## 2021-11-23 PROCEDURE — 71046 X-RAY EXAM CHEST 2 VIEWS: CPT

## 2021-11-23 PROCEDURE — 80061 LIPID PANEL: CPT | Performed by: NURSE PRACTITIONER

## 2021-11-23 PROCEDURE — 93000 ELECTROCARDIOGRAM COMPLETE: CPT | Performed by: NURSE PRACTITIONER

## 2021-11-23 PROCEDURE — 80053 COMPREHEN METABOLIC PANEL: CPT | Performed by: NURSE PRACTITIONER

## 2021-11-23 PROCEDURE — 85025 COMPLETE CBC W/AUTO DIFF WBC: CPT | Performed by: NURSE PRACTITIONER

## 2021-11-23 NOTE — PROGRESS NOTES
Chief Complaint  Chest pain    Subjective          Asad Sutton presents to [facility] for   History of Present Illness  C/o chest pain off and on for a couples months, states sometimes has associated SOB. Denies any dizziness or nausea. Denies chest pain at this moment. States the episodes do occur on a daily basis. Also admits to some SOB episodes without chest pain.   Previous smoker of 2 ppd, smoked for many years, just quit .  States pain in center chest and radiates across left side.  Denies any issues with heartburn or indigestion.  States he does have a family history of heart problems, no personal history.  Medical History  Past Medical History:   Diagnosis Date   • Foot pain, left      Surgical History  Past Surgical History:   Procedure Laterality Date   • BUNIONECTOMY      left,    • HAND SURGERY     • VEIN SURGERY       Social History  Social History     Socioeconomic History   • Marital status:    Tobacco Use   • Smoking status: Former Smoker     Quit date: 2021     Years since quittin.4   • Smokeless tobacco: Former User     Types: Chew   Vaping Use   • Vaping Use: Never used   Substance and Sexual Activity   • Alcohol use: Never   • Drug use: Yes     Types: Marijuana   • Sexual activity: Defer       Current Outpatient Medications:   •  clonazePAM (KlonoPIN) 1 MG tablet, Take 1 mg by mouth 2 (Two) Times a Day As Needed., Disp: , Rfl:     Review of Systems     Objective     There were no vitals taken for this visit.    There is no height or weight on file to calculate BMI.    Physical Exam  Vitals reviewed.   Constitutional:       Appearance: Normal appearance. He is well-developed.   HENT:      Head: Normocephalic and atraumatic.      Right Ear: External ear normal.      Left Ear: External ear normal.   Eyes:      Conjunctiva/sclera: Conjunctivae normal.      Pupils: Pupils are equal, round, and reactive to light.   Cardiovascular:       Rate and Rhythm: Normal rate and regular rhythm.      Heart sounds: No murmur heard.  No friction rub. No gallop.    Pulmonary:      Effort: Pulmonary effort is normal.      Breath sounds: Normal breath sounds. No wheezing or rhonchi.   Skin:     General: Skin is warm and dry.   Neurological:      Mental Status: He is alert and oriented to person, place, and time.      Cranial Nerves: No cranial nerve deficit.   Psychiatric:         Mood and Affect: Mood and affect normal.         Behavior: Behavior normal.         Thought Content: Thought content normal.         Judgment: Judgment normal.         Result Review :     The following data was reviewed by: RAFAL Trejo on 11/23/2021:                         ECG 12 Lead    Date/Time: 11/23/2021 1:12 PM  Performed by: Vicki Crews APRN  Authorized by: Vicki Crews APRN   Rhythm: sinus rhythm  Rate: normal  Conduction: conduction normal  QRS axis: normal  Other: no other findings    Clinical impression: normal ECG                   Assessment:  Diagnoses and all orders for this visit:    1. Chest pain, unspecified type (Primary)  -     XR Chest 2 View; Future  -     Comprehensive Metabolic Panel  -     CBC & Differential  -     TSH  -     Lipid Panel  -     Ambulatory Referral to Cardiology  -     ECG 12 Lead    2. History of smoking  -     Ambulatory Referral to Cardiology    3. Family history of heart disease  -     Ambulatory Referral to Cardiology    4. SOB (shortness of breath)  -     XR Chest 2 View; Future  -     Comprehensive Metabolic Panel  -     CBC & Differential  -     TSH  -     Ambulatory Referral to Cardiology  -     ECG 12 Lead    5. Lipid screening  -     Lipid Panel    6. Thyroid disorder screen  -     TSH         Evaluation as above, will follow up based on results.  Any worsening or severe chest pain patient is to seek immediate reevaluation.      Follow Up     Return if symptoms worsen or fail to improve.    Patient was given  instructions and counseling regarding his condition or for health maintenance advice. Please see specific information pulled into the AVS if appropriate.     Vicki Crews, APRN  11/23/2021

## 2021-11-24 LAB
ALBUMIN SERPL-MCNC: 4.5 G/DL (ref 3.5–5.2)
ALBUMIN/GLOB SERPL: 1.6 G/DL
ALP SERPL-CCNC: 77 U/L (ref 39–117)
ALT SERPL W P-5'-P-CCNC: 17 U/L (ref 1–41)
ANION GAP SERPL CALCULATED.3IONS-SCNC: 11.1 MMOL/L (ref 5–15)
AST SERPL-CCNC: 21 U/L (ref 1–40)
BILIRUB SERPL-MCNC: 0.3 MG/DL (ref 0–1.2)
BUN SERPL-MCNC: 15 MG/DL (ref 6–20)
BUN/CREAT SERPL: 15.6 (ref 7–25)
CALCIUM SPEC-SCNC: 9.9 MG/DL (ref 8.6–10.5)
CHLORIDE SERPL-SCNC: 104 MMOL/L (ref 98–107)
CHOLEST SERPL-MCNC: 203 MG/DL (ref 0–200)
CO2 SERPL-SCNC: 24.9 MMOL/L (ref 22–29)
CREAT SERPL-MCNC: 0.96 MG/DL (ref 0.76–1.27)
GFR SERPL CREATININE-BSD FRML MDRD: 86 ML/MIN/1.73
GLOBULIN UR ELPH-MCNC: 2.8 GM/DL
GLUCOSE SERPL-MCNC: 91 MG/DL (ref 65–99)
HDLC SERPL-MCNC: 35 MG/DL (ref 40–60)
LDLC SERPL CALC-MCNC: 143 MG/DL (ref 0–100)
LDLC/HDLC SERPL: 4.03 {RATIO}
POTASSIUM SERPL-SCNC: 5.2 MMOL/L (ref 3.5–5.2)
PROT SERPL-MCNC: 7.3 G/DL (ref 6–8.5)
SODIUM SERPL-SCNC: 140 MMOL/L (ref 136–145)
TRIGL SERPL-MCNC: 135 MG/DL (ref 0–150)
VLDLC SERPL-MCNC: 25 MG/DL (ref 5–40)

## 2021-11-29 ENCOUNTER — TELEPHONE (OUTPATIENT)
Dept: FAMILY MEDICINE CLINIC | Facility: CLINIC | Age: 41
End: 2021-11-29

## 2021-11-29 NOTE — TELEPHONE ENCOUNTER
----- Message from RAFAL Fournier sent at 11/29/2021  7:50 AM EST -----  Labs overall good other than the cholesterol.  Your HDL, healthy cholesterol, is too low, and your LDL, bad cholesterol, is too high.  Need to make diet changes, decrease fats and fried foods, and increase cardiovascular exercise.  Recommend recheck in 6 months and if no improvement may consider starting a low-dose statin.

## 2021-12-01 ENCOUNTER — OFFICE VISIT (OUTPATIENT)
Dept: CARDIOLOGY | Facility: CLINIC | Age: 41
End: 2021-12-01

## 2021-12-01 VITALS
HEIGHT: 78 IN | BODY MASS INDEX: 25.57 KG/M2 | SYSTOLIC BLOOD PRESSURE: 140 MMHG | WEIGHT: 221 LBS | DIASTOLIC BLOOD PRESSURE: 96 MMHG | HEART RATE: 62 BPM

## 2021-12-01 DIAGNOSIS — R07.9 CHEST PAIN, UNSPECIFIED TYPE: Primary | ICD-10-CM

## 2021-12-01 DIAGNOSIS — E78.2 MIXED HYPERLIPIDEMIA: ICD-10-CM

## 2021-12-01 DIAGNOSIS — Z82.49 FAMILY HISTORY OF ISCHEMIC HEART DISEASE (IHD): ICD-10-CM

## 2021-12-01 DIAGNOSIS — R06.02 SHORTNESS OF BREATH: ICD-10-CM

## 2021-12-01 PROCEDURE — 99204 OFFICE O/P NEW MOD 45 MIN: CPT | Performed by: INTERNAL MEDICINE

## 2021-12-01 PROCEDURE — 93000 ELECTROCARDIOGRAM COMPLETE: CPT | Performed by: INTERNAL MEDICINE

## 2021-12-01 NOTE — PROGRESS NOTES
"Chief Complaint  Chest Pain (on and off for awhile) and Shortness of Breath      Chest Pain   Associated symptoms include shortness of breath.   Shortness of Breath  Associated symptoms include chest pain.     Asad Sutton presents to [facility]    This is a pleasant 41-year-old gentleman with anxiety, was referred to clinic for cardiac evaluation.  He has been having sporadic episodes of left-sided chest discomfort for the last year.  It varies in duration but usually lasts few minutes.  It is associated with shortness of breath.  He denies other associated symptoms.  He feels good otherwise.  He remains physically active without extraordinary limitations.  He has no palpitations, orthopnea, edema, dizziness, presyncope or syncope.  He used to smoke but quit few months ago.    Past Medical History:   Diagnosis Date   • Foot pain, left    • SOB (shortness of breath)          Current Outpatient Medications:   •  clonazePAM (KlonoPIN) 1 MG tablet, Take 1 mg by mouth 2 (Two) Times a Day As Needed., Disp: , Rfl:     There are no discontinued medications.  Allergies   Allergen Reactions   • Penicillins Unknown - Low Severity        Social History     Tobacco Use   • Smoking status: Former Smoker     Quit date: 2021     Years since quittin.5   • Smokeless tobacco: Former User     Types: Chew   Vaping Use   • Vaping Use: Never used   Substance Use Topics   • Alcohol use: Never   • Drug use: Yes     Types: Marijuana       Family History   Problem Relation Age of Onset   • Hypertension Father    • Hypertension Brother    • Diabetes Maternal Grandfather    • Diabetes Paternal Grandfather    • Cancer Neg Hx         Objective     /96   Pulse 62   Ht 198.1 cm (78\")   Wt 100 kg (221 lb)   BMI 25.54 kg/m²       Physical Exam  Constitutional:       General: Awake. Not in acute distress.     Appearance: Normal appearance.   Neck:      Vascular: No carotid bruit, hepatojugular " reflux or JVD.   Cardiovascular:      Rate and Rhythm: Normal rate and regular rhythm.      Chest Wall: PMI is not displaced.      Heart sounds: Normal heart sounds, S1 normal and S2 normal. No murmur heard.   No friction rub. No gallop. No S3 or S4 sounds.    Pulmonary:      Effort: Pulmonary effort is normal.      Breath sounds: Normal breath sounds. No wheezing, rhonchi or rales.   Ext.      Right lower leg: No edema.      Left lower leg: No edema.   Skin:     General: Skin is warm and dry.      Coloration: Skin is not cyanotic.      Findings: No petechiae or rash.   Neurological:      Mental Status: Alert and oriented x 3  Psychiatric:         Behavior: Behavior is cooperative.       Result Review :     No results found for: PROBNP  CMP    CMP 11/23/21   Glucose 91   BUN 15   Creatinine 0.96   eGFR Non African Am 86   Sodium 140   Potassium 5.2   Chloride 104   Calcium 9.9   Albumin 4.50   Total Bilirubin 0.3   Alkaline Phosphatase 77   AST (SGOT) 21   ALT (SGPT) 17           CBC w/diff    CBC w/Diff 11/23/21   WBC 9.68   RBC 5.01   Hemoglobin 15.2   Hematocrit 44.5   MCV 88.8   MCH 30.3   MCHC 34.2   RDW 12.5   Platelets 331   Neutrophil Rel % 60.3   Immature Granulocyte Rel % 0.2   Lymphocyte Rel % 24.8   Monocyte Rel % 7.1   Eosinophil Rel % 7.0 (A)   Basophil Rel % 0.6   (A) Abnormal value             Lab Results   Component Value Date    TSH 1.120 11/23/2021      Lab Results   Component Value Date    FREET4 1.3 01/23/2020      No results found for: DDIMERQUANT  No results found for: MG   No results found for: DIGOXIN   Lab Results   Component Value Date    TROPONINT <0.01 07/16/2020      No results found for: POCTROP(       Lipid Panel    Lipid Panel 11/23/21   Total Cholesterol 203 (A)   Triglycerides 135   HDL Cholesterol 35 (A)   VLDL Cholesterol 25   LDL Cholesterol  143 (A)   LDL/HDL Ratio 4.03   (A) Abnormal value                 ECG 12 Lead    Date/Time: 12/1/2021 12:08 PM  Performed by: Bhupinder  MD Jimena  Authorized by: Jimena Roe MD   Previous ECG: no previous ECG available  Rhythm: sinus rhythm  Rate: normal  QRS axis: normal    Clinical impression: normal ECG              No results found for this or any previous visit.                Diagnoses and all orders for this visit:    1. Chest pain, unspecified type (Primary)  -     ECG 12 Lead  -     Adult Transthoracic Echo Complete W/ Cont if Necessary Per Protocol; Future  -     Treadmill Stress Test; Future    2. Shortness of breath  -     Adult Transthoracic Echo Complete W/ Cont if Necessary Per Protocol; Future  -     Treadmill Stress Test; Future    3. Family history of ischemic heart disease (IHD)    4. Mixed hyperlipidemia      Assessment:    -Chest pain/shortness of breath: He has been having recurrent episodes of sporadic chest discomfort for the last year as described in HPI.  His exam is benign.  ECG shows normal sinus rhythm without ischemic ST-T changes.  He has an intermediate pretest probability for CAD.  He will be scheduled for treadmill stress test to assess for ischemic ST-T changes.  Echo will be done to assess LVEF, wall motion and valvular function.  Further recommendations to follow.    -Dyslipidemia: Recent lipid profile was noted.  Calculated 10-year atherosclerotic cardiovascular risk is 2.5% which is low.  Lifestyle changes including regular exercise, dietary restrictions and weight loss were discussed with the patient.    -Smoking: He quit few months ago.  He was advised to remain quit.    -Family history of ischemic heart disease: His father had coronary stents in his 50s.  Cardiac testing will be done as discussed above.    Patient will return to clinic in 2-3 months or sooner if needed.    Follow Up       Return in about 3 months (around 3/1/2022).    Patient was given instructions and counseling regarding his condition or for health maintenance advice. Please see specific information pulled into the AVS if  appropriate.

## 2022-04-01 ENCOUNTER — HOSPITAL ENCOUNTER (EMERGENCY)
Facility: HOSPITAL | Age: 42
Discharge: LEFT BEFORE DISCHARGE | End: 2022-04-01

## 2022-04-01 ENCOUNTER — TELEPHONE (OUTPATIENT)
Dept: FAMILY MEDICINE CLINIC | Facility: CLINIC | Age: 42
End: 2022-04-01

## 2022-04-01 VITALS
WEIGHT: 218.03 LBS | DIASTOLIC BLOOD PRESSURE: 107 MMHG | SYSTOLIC BLOOD PRESSURE: 137 MMHG | OXYGEN SATURATION: 100 % | HEART RATE: 61 BPM | HEIGHT: 78 IN | TEMPERATURE: 97.6 F | BODY MASS INDEX: 25.23 KG/M2 | RESPIRATION RATE: 18 BRPM

## 2022-04-01 LAB
ALBUMIN SERPL-MCNC: 5 G/DL (ref 3.5–5.2)
ALBUMIN/GLOB SERPL: 2.2 G/DL
ALP SERPL-CCNC: 89 U/L (ref 39–117)
ALT SERPL W P-5'-P-CCNC: 21 U/L (ref 1–41)
ANION GAP SERPL CALCULATED.3IONS-SCNC: 10.5 MMOL/L (ref 5–15)
AST SERPL-CCNC: 19 U/L (ref 1–40)
BASOPHILS # BLD AUTO: 0.07 10*3/MM3 (ref 0–0.2)
BASOPHILS NFR BLD AUTO: 0.7 % (ref 0–1.5)
BILIRUB SERPL-MCNC: 0.4 MG/DL (ref 0–1.2)
BILIRUB UR QL STRIP: NEGATIVE
BUN SERPL-MCNC: 18 MG/DL (ref 6–20)
BUN/CREAT SERPL: 19.6 (ref 7–25)
CALCIUM SPEC-SCNC: 9.6 MG/DL (ref 8.6–10.5)
CHLORIDE SERPL-SCNC: 104 MMOL/L (ref 98–107)
CLARITY UR: CLEAR
CO2 SERPL-SCNC: 24.5 MMOL/L (ref 22–29)
COLOR UR: YELLOW
CREAT SERPL-MCNC: 0.92 MG/DL (ref 0.76–1.27)
DEPRECATED RDW RBC AUTO: 42.8 FL (ref 37–54)
EGFRCR SERPLBLD CKD-EPI 2021: 107.2 ML/MIN/1.73
EOSINOPHIL # BLD AUTO: 0.64 10*3/MM3 (ref 0–0.4)
EOSINOPHIL NFR BLD AUTO: 6.6 % (ref 0.3–6.2)
ERYTHROCYTE [DISTWIDTH] IN BLOOD BY AUTOMATED COUNT: 13.5 % (ref 12.3–15.4)
GLOBULIN UR ELPH-MCNC: 2.3 GM/DL
GLUCOSE SERPL-MCNC: 103 MG/DL (ref 65–99)
GLUCOSE UR STRIP-MCNC: NEGATIVE MG/DL
HCT VFR BLD AUTO: 41.3 % (ref 37.5–51)
HGB BLD-MCNC: 14.3 G/DL (ref 13–17.7)
HGB UR QL STRIP.AUTO: NEGATIVE
HOLD SPECIMEN: NORMAL
HOLD SPECIMEN: NORMAL
IMM GRANULOCYTES # BLD AUTO: 0.02 10*3/MM3 (ref 0–0.05)
IMM GRANULOCYTES NFR BLD AUTO: 0.2 % (ref 0–0.5)
KETONES UR QL STRIP: NEGATIVE
LEUKOCYTE ESTERASE UR QL STRIP.AUTO: NEGATIVE
LIPASE SERPL-CCNC: 239 U/L (ref 13–60)
LYMPHOCYTES # BLD AUTO: 2.27 10*3/MM3 (ref 0.7–3.1)
LYMPHOCYTES NFR BLD AUTO: 23.4 % (ref 19.6–45.3)
MCH RBC QN AUTO: 30.2 PG (ref 26.6–33)
MCHC RBC AUTO-ENTMCNC: 34.6 G/DL (ref 31.5–35.7)
MCV RBC AUTO: 87.1 FL (ref 79–97)
MONOCYTES # BLD AUTO: 0.68 10*3/MM3 (ref 0.1–0.9)
MONOCYTES NFR BLD AUTO: 7 % (ref 5–12)
NEUTROPHILS NFR BLD AUTO: 6.01 10*3/MM3 (ref 1.7–7)
NEUTROPHILS NFR BLD AUTO: 62.1 % (ref 42.7–76)
NITRITE UR QL STRIP: NEGATIVE
NRBC BLD AUTO-RTO: 0 /100 WBC (ref 0–0.2)
PH UR STRIP.AUTO: 6 [PH] (ref 5–8)
PLATELET # BLD AUTO: 290 10*3/MM3 (ref 140–450)
PMV BLD AUTO: 10.2 FL (ref 6–12)
POTASSIUM SERPL-SCNC: 4.5 MMOL/L (ref 3.5–5.2)
PROT SERPL-MCNC: 7.3 G/DL (ref 6–8.5)
PROT UR QL STRIP: NEGATIVE
RBC # BLD AUTO: 4.74 10*6/MM3 (ref 4.14–5.8)
SODIUM SERPL-SCNC: 139 MMOL/L (ref 136–145)
SP GR UR STRIP: 1.02 (ref 1–1.03)
UROBILINOGEN UR QL STRIP: NORMAL
WBC NRBC COR # BLD: 9.69 10*3/MM3 (ref 3.4–10.8)
WHOLE BLOOD HOLD SPECIMEN: NORMAL
WHOLE BLOOD HOLD SPECIMEN: NORMAL

## 2022-04-01 PROCEDURE — 83690 ASSAY OF LIPASE: CPT

## 2022-04-01 PROCEDURE — 81003 URINALYSIS AUTO W/O SCOPE: CPT

## 2022-04-01 PROCEDURE — 99211 OFF/OP EST MAY X REQ PHY/QHP: CPT

## 2022-04-01 PROCEDURE — 36415 COLL VENOUS BLD VENIPUNCTURE: CPT

## 2022-04-01 PROCEDURE — 80053 COMPREHEN METABOLIC PANEL: CPT

## 2022-04-01 PROCEDURE — 85025 COMPLETE CBC W/AUTO DIFF WBC: CPT

## 2022-04-01 RX ORDER — SODIUM CHLORIDE 0.9 % (FLUSH) 0.9 %
10 SYRINGE (ML) INJECTION AS NEEDED
Status: DISCONTINUED | OUTPATIENT
Start: 2022-04-01 | End: 2022-04-01 | Stop reason: HOSPADM

## 2022-04-01 NOTE — TELEPHONE ENCOUNTER
Caller: ROSSI VILLASEÑOR    Relationship to patient: Emergency Contact    Best call back number: [phone]    Chief complaint: STOMACH CRAMPS AROUND THE TOP AND HAS BEEN FOR A WEEK     Type of visit: SAME DAY APT     Requested date: TODAY 04/01/2022

## 2022-04-04 ENCOUNTER — OFFICE VISIT (OUTPATIENT)
Dept: FAMILY MEDICINE CLINIC | Facility: CLINIC | Age: 42
End: 2022-04-04

## 2022-04-04 VITALS
OXYGEN SATURATION: 98 % | BODY MASS INDEX: 25.22 KG/M2 | DIASTOLIC BLOOD PRESSURE: 76 MMHG | SYSTOLIC BLOOD PRESSURE: 124 MMHG | HEIGHT: 78 IN | WEIGHT: 218 LBS | HEART RATE: 64 BPM

## 2022-04-04 DIAGNOSIS — R10.10 UPPER ABDOMINAL PAIN OF UNKNOWN ETIOLOGY: Primary | ICD-10-CM

## 2022-04-04 DIAGNOSIS — M25.512 LEFT SHOULDER PAIN, UNSPECIFIED CHRONICITY: ICD-10-CM

## 2022-04-04 PROCEDURE — 93000 ELECTROCARDIOGRAM COMPLETE: CPT

## 2022-04-04 PROCEDURE — 99213 OFFICE O/P EST LOW 20 MIN: CPT

## 2022-04-04 PROCEDURE — 96372 THER/PROPH/DIAG INJ SC/IM: CPT

## 2022-04-04 RX ORDER — OMEPRAZOLE 20 MG/1
20 CAPSULE, DELAYED RELEASE ORAL DAILY
Qty: 90 CAPSULE | Refills: 1 | Status: SHIPPED | OUTPATIENT
Start: 2022-04-04 | End: 2022-10-05

## 2022-04-04 RX ORDER — OMEPRAZOLE 20 MG/1
20 CAPSULE, DELAYED RELEASE ORAL DAILY
Qty: 90 CAPSULE | Refills: 1 | Status: CANCELLED | OUTPATIENT
Start: 2022-04-04

## 2022-04-04 RX ORDER — TRIAMCINOLONE ACETONIDE 40 MG/ML
40 INJECTION, SUSPENSION INTRA-ARTICULAR; INTRAMUSCULAR ONCE
Status: COMPLETED | OUTPATIENT
Start: 2022-04-04 | End: 2022-04-04

## 2022-04-04 RX ADMIN — TRIAMCINOLONE ACETONIDE 40 MG: 40 INJECTION, SUSPENSION INTRA-ARTICULAR; INTRAMUSCULAR at 08:14

## 2022-04-04 NOTE — ASSESSMENT & PLAN NOTE
Most recent labs unremarkable - Lipase was elevated however isolated elevation will just watch. Discussed differential diagnoses - GERD vs cholecystitis. Will start PPI and send for abdominal ultrasound. Also concerned that it could be cardiac related - however EKG office was normal. Discussed if symptoms worsen, persist or change to go to the ER. Patient verbalized understanding. Patient to follow up after results of ultrasound come back.

## 2022-04-04 NOTE — PROGRESS NOTES
"Chief Complaint  Abdominal Pain (X1 week, no bowel issues) and Shoulder Pain (Pinched nerve in left arm)    Subjective          Asad Sutton presents to [facility]  History of Present Illness    Upper abdominal pain -   Asad is here for upper abdominal pain. He explains he went to the ER on 4/1 for upper abdominal pain, it went away while he was there and he left before being seen. He did have some labs drawn at that time. He is here today for the same symptoms. He reports he has had this abdominal pain for about a week now. The pain comes and goes; eating makes it worse. He is also burping a lot. No vomiting/some nausea. No BM issues. He denies this happening before. He does smoke marijuana occasionally but denies any other drug us or drinking alcohol.     Left shoulder pain -   He is also here for left shoulder pain. He reports it will get more uncomfortable off/on depending on what he does. He does report a strenuous job so he wonders if it is that sometimes. He says he will get a kenalog shot for it when he is here in the past and it helps. He is requesting one today if possible.     Objective   Vital Signs:   /76   Pulse 64   Ht 198.1 cm (78\")   Wt 98.9 kg (218 lb)   SpO2 98%   BMI 25.19 kg/m²       Physical Exam  Vitals reviewed.   Constitutional:       General: He is not in acute distress.     Appearance: Normal appearance. He is not diaphoretic.   HENT:      Head: Normocephalic and atraumatic. Hair is normal.      Right Ear: Hearing, tympanic membrane, ear canal and external ear normal.      Left Ear: Hearing, tympanic membrane, ear canal and external ear normal.      Nose: Nose normal. No nasal deformity.      Mouth/Throat:      Mouth: Mucous membranes are moist. No oral lesions.      Pharynx: Uvula midline. No uvula swelling.   Eyes:      General: Lids are normal. No scleral icterus.        Right eye: No discharge.         Left eye: No discharge.      " Extraocular Movements: Extraocular movements intact.      Right eye: Normal extraocular motion and no nystagmus.      Left eye: Normal extraocular motion and no nystagmus.      Conjunctiva/sclera: Conjunctivae normal.      Pupils: Pupils are equal, round, and reactive to light.   Neck:      Thyroid: No thyroid mass, thyromegaly or thyroid tenderness.      Vascular: No JVD.   Cardiovascular:      Rate and Rhythm: Normal rate and regular rhythm.      Pulses: Normal pulses.      Heart sounds: No murmur heard.  Pulmonary:      Effort: Pulmonary effort is normal. No respiratory distress.      Breath sounds: Normal breath sounds. No wheezing or rales.   Chest:      Chest wall: No tenderness.   Abdominal:      General: Bowel sounds are normal. There is no distension.      Palpations: Abdomen is soft. There is no mass.      Tenderness: There is generalized abdominal tenderness and tenderness in the epigastric area. There is no right CVA tenderness, left CVA tenderness, guarding or rebound. Negative signs include George's sign, Rovsing's sign, McBurney's sign, psoas sign and obturator sign.      Hernia: No hernia is present.   Musculoskeletal:         General: No swelling, tenderness or deformity. Normal range of motion.      Cervical back: Normal range of motion and neck supple.   Lymphadenopathy:      Cervical: No cervical adenopathy.   Skin:     General: Skin is warm and dry.      Findings: No rash.   Neurological:      Mental Status: He is alert and oriented to person, place, and time.      Cranial Nerves: No cranial nerve deficit.      Coordination: Coordination normal.      Deep Tendon Reflexes: Reflexes are normal and symmetric. Reflexes normal.   Psychiatric:         Mood and Affect: Mood normal.         Behavior: Behavior normal.         Thought Content: Thought content normal.         Judgment: Judgment normal.        Result Review :            ECG 12 Lead    Date/Time: 4/4/2022 12:36 PM  Performed by: Alexis  JUAN Singleton  Authorized by: Chika Collier APRN   Comparison: not compared with previous ECG   Rhythm: sinus rhythm  Rate: normal  BPM: 63  QRS axis: normal    Clinical impression: normal ECG              Assessment and Plan    Diagnoses and all orders for this visit:    1. Upper abdominal pain of unknown etiology (Primary)  Assessment & Plan:  Most recent labs unremarkable - Lipase was elevated however isolated elevation will just watch. Discussed differential diagnoses - GERD vs cholecystitis. Will start PPI and send for abdominal ultrasound. Also concerned that it could be cardiac related - however EKG office was normal. Discussed if symptoms worsen, persist or change to go to the ER. Patient verbalized understanding. Patient to follow up after results of ultrasound come back.      Orders:  -     ECG 12 Lead  -     US Abdomen Complete; Future  -     omeprazole (priLOSEC) 20 MG capsule; Take 1 capsule by mouth Daily.  Dispense: 90 capsule; Refill: 1    2. Left shoulder pain, unspecified chronicity  Comments:  Will administer kenalog shot. Assessment WNL. Discussed imaging however patient declined at this time.   Orders:  -     triamcinolone acetonide (KENALOG-40) injection 40 mg    Patient was instructed and educated on their diagnoses and treatment plan prior to leaving the office. If symptoms worsen or persist, seek emergency medical attention. Take all medications as prescribed. Call the office if any questions or concerns arise.     I spent 28 minutes caring for Asad on this date of service. This time includes time spent by me in the following activities:preparing for the visit, reviewing tests, obtaining and/or reviewing a separately obtained history, performing a medically appropriate examination and/or evaluation , counseling and educating the patient/family/caregiver, ordering medications, tests, or procedures, documenting information in the medical record and independently interpreting results and  communicating that information with the patient/family/caregiver  Follow Up   Return if symptoms worsen or fail to improve, for Next scheduled follow up.  Patient was given instructions and counseling regarding his condition or for health maintenance advice. Please see specific information pulled into the AVS if appropriate.

## 2022-04-06 ENCOUNTER — TELEPHONE (OUTPATIENT)
Dept: INTERNAL MEDICINE | Facility: CLINIC | Age: 42
End: 2022-04-06

## 2022-04-06 NOTE — TELEPHONE ENCOUNTER
Caller: Asad Sutton    Relationship: Self    Best call back number: [phone]     What is the best time to reach you: ANY TIME     Who are you requesting to speak with (clinical staff, provider,  specific staff member): CLINICAL        What was the call regarding: PATIENT STATES THAT HE WAS SUPPOSED TO GET A SCAN OF HIS GALLBLADDER AND NO ONE HAS EVER CALLED. ALSO, WANTED TO KNOW IT MEDICATIONS HAD BEEN SENT TO THE PHARMACY FROM HIS VISIT ON 04/04/2022    Do you require a callback: YES

## 2022-04-06 NOTE — TELEPHONE ENCOUNTER
Called patient and he has US scheduled. Informed patient that omeprazole was sent to [facility] 4/4/22

## 2022-04-08 ENCOUNTER — HOSPITAL ENCOUNTER (OUTPATIENT)
Dept: ULTRASOUND IMAGING | Facility: HOSPITAL | Age: 42
Discharge: HOME | End: 2022-04-08

## 2022-04-08 DIAGNOSIS — R10.10 UPPER ABDOMINAL PAIN OF UNKNOWN ETIOLOGY: ICD-10-CM

## 2022-04-08 PROCEDURE — 76700 US EXAM ABDOM COMPLETE: CPT

## 2022-04-11 ENCOUNTER — TELEPHONE (OUTPATIENT)
Dept: FAMILY MEDICINE CLINIC | Facility: CLINIC | Age: 42
End: 2022-04-11

## 2022-04-11 NOTE — TELEPHONE ENCOUNTER
----- Message from RAFAL Taylor sent at 4/8/2022  1:30 PM EDT -----  Please call the patient and let him know his abdominal ultrasound was normal.

## 2022-05-17 ENCOUNTER — TELEPHONE (OUTPATIENT)
Dept: PODIATRY | Facility: CLINIC | Age: 42
End: 2022-05-17

## 2022-05-17 NOTE — TELEPHONE ENCOUNTER
Caller:PATIENT        Relationship to patient: SELF     Best call back number: [phone]    Chief complaint: LEFT FOOT AND TOE PAIN     Type of visit:WORK IN      Requested date: ASAP        Additional notes: PT. STATES HE HAD SURGERY ON LEFT FOOT WITH DR. PATRICK [place] [place].  HE WAS LAST SEEN 09/2021.   PT. STATES THAT HE IS HAVING LEFT FOOT AND TOE PAIN AND NUMBNESS THAT IS GETTING WORSE.   HE STATES THAT HIS TOES ARE TURNING BLACK.   ASKING IF HE CAN BE WORKED IN THIS WEEK.   PLEASE CALL TO ADVISE.

## 2022-05-24 ENCOUNTER — OFFICE VISIT (OUTPATIENT)
Dept: PODIATRY | Facility: CLINIC | Age: 42
End: 2022-05-24

## 2022-05-24 VITALS
DIASTOLIC BLOOD PRESSURE: 81 MMHG | SYSTOLIC BLOOD PRESSURE: 129 MMHG | HEIGHT: 78 IN | OXYGEN SATURATION: 100 % | HEART RATE: 62 BPM | BODY MASS INDEX: 25.22 KG/M2 | WEIGHT: 218 LBS | TEMPERATURE: 96.9 F

## 2022-05-24 DIAGNOSIS — M79.672 FOOT PAIN, LEFT: ICD-10-CM

## 2022-05-24 DIAGNOSIS — G57.62 MORTON NEUROMA, LEFT: Primary | ICD-10-CM

## 2022-05-24 PROCEDURE — 99213 OFFICE O/P EST LOW 20 MIN: CPT | Performed by: PODIATRIST

## 2022-05-24 NOTE — PROGRESS NOTES
[facility] - PODIATRY    Today's Date: 22    Patient Name: Asad Sutton  MRN: [id number]  CSN: [id number]  PCP: Vicki Crews APRN  Referring Provider: No ref. provider found    SUBJECTIVE     Chief Complaint   Patient presents with   • Left Foot - Numbness, Pain     Toes keep turning black or bruising / sharp pains      HPI: Asad Sutton, a 41 y.o.male, comes to clinic.    New, Established, New Problem: Established    Location: Plantar left second intermetatarsal space    Duration: 2021    Onset:  gradual    Nature:  achy, sharp, shooting    Stable, worsening, improving: Intermittent  Aggravating factors:  Patient describes left foot pain as stabbing, burning, or aching. This pain is in the left second intermetatarsal space.  This is aggravated with shoe gear and ambulation.      Previous Treatment: Cortisone injection in the past.  Patient states his symptoms do not hurt bad enough at this time for cortisone injection.    Patient denies any fevers, chills, nausea, vomiting, shortness of breath, nor any other constitutional signs nor symptoms.    Past Medical History:   Diagnosis Date   • Bunion    • Foot pain, left    • Numbness of toes    • SOB (shortness of breath)      Past Surgical History:   Procedure Laterality Date   • BUNIONECTOMY      left2020   • HAND SURGERY     • VEIN SURGERY       Family History   Problem Relation Age of Onset   • Hypertension Father    • Hypertension Brother    • Diabetes Maternal Grandfather    • Diabetes Paternal Grandfather    • Cancer Neg Hx      Social History     Socioeconomic History   • Marital status:    Tobacco Use   • Smoking status: Former Smoker     Quit date: 2021     Years since quittin.9   • Smokeless tobacco: Former User     Types: Chew   Vaping Use   • Vaping Use: Never used   Substance and Sexual Activity   • Alcohol use: Never   • Drug use: Not Currently     Types: Marijuana   • Sexual activity: Defer      Allergies   Allergen Reactions   • Penicillins Unknown - Low Severity     Current Outpatient Medications   Medication Sig Dispense Refill   • clonazePAM (KlonoPIN) 1 MG tablet Take 1 mg by mouth 2 (Two) Times a Day As Needed.     • omeprazole (priLOSEC) 20 MG capsule Take 1 capsule by mouth Daily. 90 capsule 1     No current facility-administered medications for this visit.     Review of Systems   Constitutional: Negative.    Musculoskeletal:        Plantar left second intermetatarsal space -intermittent   All other systems reviewed and are negative.      OBJECTIVE     Vitals:    05/24/22 0725   BP: 129/81   Pulse: 62   Temp: 96.9 °F (36.1 °C)   SpO2: 100%       PHYSICAL EXAM     Foot/Ankle Exam:       General:   Appearance: appears stated age and healthy    Orientation: AAOx3    Affect: appropriate    Gait: unimpaired    Shoe Gear:  Boots    VASCULAR      Right Foot Vascularity   Normal vascular exam    Dorsalis pedis:  2+  Posterior tibial:  2+  Skin Temperature: warm    Edema Grading:  None  CFT:  < 3 seconds  Pedal Hair Growth:  Present  Varicosities: none       Left Foot Vascularity   Normal vascular exam    Dorsalis pedis:  2+  Posterior tibial:  2+  Skin Temperature: warm    Edema Grading:  None  CFT:  < 3 seconds  Pedal Hair Growth:  Present  Varicosities: none        NEUROLOGIC     Right Foot Neurologic   Normal sensation    Light touch sensation:  Normal  Vibratory sensation:  Normal  Hot/Cold sensation: normal       Left Foot Neurologic   Normal sensation    Light touch sensation:  Normal  Vibratory sensation:  Normal  Hot/cold sensation: normal    Protective Sensation using [place] Monofilament:  10     MUSCULOSKELETAL      Left Foot Musculoskeletal   Tenderness: neuroma and 2nd MTJP       MUSCLE STRENGTH     Right Foot Muscle Strength   Normal strength    Foot dorsiflexion:  5  Foot plantar flexion:  5  Foot inversion:  5  Foot eversion:  5     Left Foot Muscle Strength   Foot dorsiflexion:   5  Foot plantar flexion:  5  Foot inversion:  5  Foot eversion:  5     RANGE OF MOTION      Right Foot Range of Motion   Foot and ankle ROM within normal limits       Left Foot Range of Motion   Foot and ankle ROM within normal limits       DERMATOLOGIC     Right Foot Dermatologic   Skin: skin intact    Nails: normal       Left Foot Dermatologic   Skin: skin intact    Nails: normal       ASSESSMENT/PLAN     Diagnoses and all orders for this visit:    1. Burdick neuroma, left (Primary)  -     Ambulatory Referral For Orthotics    2. Foot pain, left    Prescription was written for custom made orthotics.    Comprehensive lower extremity examination and evaluation was performed.    Discussed findings and treatment plan including risks, benefits, and treatment options with patient in detail. Patient agreed with treatment plan.    Discussed proper shoegear for the patient's feet and medical condition.  Patient states understanding and agreement with this plan.    An After Visit Summary was printed and given to the patient at discharge, including (if requested) any available informative/educational handouts regarding diagnosis, treatment, or medications. All questions were answered to patient/family satisfaction. Should symptoms fail to improve or worsen they agree to call or return to clinic or to go to the Emergency Department. Discussed the importance of following up with any needed screening tests/labs/specialist appointments and any requested follow-up recommended by me today. Importance of maintaining follow-up discussed and patient accepts that missed appointments can delay diagnosis and potentially lead to worsening of conditions.    Return if symptoms worsen or fail to improve., or sooner if acute issues arise.    This document has been electronically signed by Lino Brownlee DPM on May 24, 2022 07:47 EDT

## 2022-05-26 ENCOUNTER — OFFICE VISIT (OUTPATIENT)
Dept: FAMILY MEDICINE CLINIC | Facility: CLINIC | Age: 42
End: 2022-05-26

## 2022-05-26 VITALS
DIASTOLIC BLOOD PRESSURE: 94 MMHG | OXYGEN SATURATION: 97 % | BODY MASS INDEX: 24.18 KG/M2 | WEIGHT: 209 LBS | HEART RATE: 68 BPM | SYSTOLIC BLOOD PRESSURE: 128 MMHG | HEIGHT: 78 IN

## 2022-05-26 DIAGNOSIS — R22.2 SUBCUTANEOUS NODULE OF ABDOMINAL WALL: Primary | ICD-10-CM

## 2022-05-26 DIAGNOSIS — F41.9 ANXIETY: ICD-10-CM

## 2022-05-26 DIAGNOSIS — R03.0 ELEVATED BLOOD PRESSURE READING: ICD-10-CM

## 2022-05-26 PROCEDURE — 99214 OFFICE O/P EST MOD 30 MIN: CPT | Performed by: NURSE PRACTITIONER

## 2022-05-26 RX ORDER — BUPROPION HYDROCHLORIDE 150 MG/1
150 TABLET ORAL DAILY
Qty: 30 TABLET | Refills: 1 | OUTPATIENT
Start: 2022-05-26 | End: 2022-06-01

## 2022-05-26 RX ORDER — LORAZEPAM 0.5 MG/1
TABLET ORAL
Qty: 20 TABLET | Refills: 0 | Status: SHIPPED | OUTPATIENT
Start: 2022-05-26 | End: 2022-07-25

## 2022-05-26 NOTE — ASSESSMENT & PLAN NOTE
After long discussion patient and I have decided that we will trial treating his anxiety with Wellbutrin.  Patient previously had a GeneSight swab done in the past but never received results, we were able to pull this out of the old medical system and review it together.  Side effects administration of medication discussed, we are also going to change patient's Klonopin to Ativan.  He reports that he always feels bad after taking the Klonopin so we will trial switching it.  Patient appears to use this medication very sparingly, discussed continued sparing use.

## 2022-05-26 NOTE — PROGRESS NOTES
Chief Complaint  Hypertension, anxiety, not on stomach.  Subjective          Asad Sutton presents to [facility] for   History of Present Illness  Patient presents today with complaints ofHypertension (Patient says BP has been running 150/98,.  Patient states that he has been seeing cardiology for episodes of chest pain who has had that worked up including a stress test which were all negative.  He wonders if his anxiety is not the cause of all.  Patient reports that he smoked marijuana for many years but recently quit and seems to be having a lot of issues ever since then.  He is considering trying CBD oil to help with the anxiety, states he did purchase some but it had THC in it and he still obtained a buzz which is what he is trying to avoid and why he quit smoking marijuana, states he is considering trying a different CBD oil.  Also complaining of a Knot on stomach (painful).  States this has been there for quite some time however has gotten larger and is now tender at times.    Medical History  Past Medical History:   Diagnosis Date   • Bunion    • Foot pain, left    • Numbness of toes    • SOB (shortness of breath)      Surgical History  Past Surgical History:   Procedure Laterality Date   • BUNIONECTOMY      left,    • HAND SURGERY     • VEIN SURGERY       Social History  Social History     Socioeconomic History   • Marital status:    Tobacco Use   • Smoking status: Former Smoker     Quit date: 2021     Years since quittin.9   • Smokeless tobacco: Former User     Types: Chew   Vaping Use   • Vaping Use: Never used   Substance and Sexual Activity   • Alcohol use: Never   • Drug use: Not Currently     Types: Marijuana   • Sexual activity: Defer       Current Outpatient Medications:   •  omeprazole (priLOSEC) 20 MG capsule, Take 1 capsule by mouth Daily., Disp: 90 capsule, Rfl: 1  •  buPROPion XL (Wellbutrin XL) 150 MG 24 hr tablet, Take 1 tablet by mouth  "Daily., Disp: 30 tablet, Rfl: 1  •  LORazepam (Ativan) 0.5 MG tablet, QD PRN, Disp: 20 tablet, Rfl: 0    Review of Systems     Objective     /94   Pulse 68   Ht 198.1 cm (78\")   Wt 94.8 kg (209 lb)   SpO2 97%   BMI 24.15 kg/m²     Body mass index is 24.15 kg/m².      BP re-check 124/80       Physical Exam  Vitals reviewed.   Constitutional:       Appearance: Normal appearance. He is well-developed.   HENT:      Head: Normocephalic and atraumatic.      Right Ear: External ear normal.      Left Ear: External ear normal.      Mouth/Throat:      Pharynx: No oropharyngeal exudate.   Eyes:      Conjunctiva/sclera: Conjunctivae normal.      Pupils: Pupils are equal, round, and reactive to light.   Cardiovascular:      Rate and Rhythm: Normal rate and regular rhythm.      Heart sounds: No murmur heard.    No friction rub. No gallop.   Pulmonary:      Effort: Pulmonary effort is normal.      Breath sounds: Normal breath sounds. No wheezing or rhonchi.   Abdominal:      General: Bowel sounds are normal. There is no distension.      Palpations: Abdomen is soft.      Tenderness: There is no abdominal tenderness.      Comments: Palpable 1 cm subcutaneous firm nodule on left side upper abdomen.  Feels most consistent with probable cyst.  Mildly tender to palpation   Skin:     General: Skin is warm and dry.   Neurological:      Mental Status: He is alert and oriented to person, place, and time.      Cranial Nerves: No cranial nerve deficit.   Psychiatric:         Mood and Affect: Mood and affect normal.         Behavior: Behavior normal.         Thought Content: Thought content normal.         Judgment: Judgment normal.         Result Review :     The following data was reviewed by: RAFAL Trejo on 05/26/2022:                         Assessment:  Diagnoses and all orders for this visit:    1. Subcutaneous nodule of abdominal wall (Primary)  -     US Abdomen Limited; Future    2. Anxiety  Assessment & " Plan:  After long discussion patient and I have decided that we will trial treating his anxiety with Wellbutrin.  Patient previously had a GeneSight swab done in the past but never received results, we were able to pull this out of the old medical system and review it together.  Side effects administration of medication discussed, we are also going to change patient's Klonopin to Ativan.  He reports that he always feels bad after taking the Klonopin so we will trial switching it.  Patient appears to use this medication very sparingly, discussed continued sparing use.    Orders:  -     buPROPion XL (Wellbutrin XL) 150 MG 24 hr tablet; Take 1 tablet by mouth Daily.  Dispense: 30 tablet; Refill: 1  -     LORazepam (Ativan) 0.5 MG tablet; QD PRN  Dispense: 20 tablet; Refill: 0    3. Elevated blood pressure reading  Assessment & Plan:  Discussed with patient that his blood pressure is normal upon manual recheck in office today, patient states that the blood pressure cuff he has been checking with his home they have had for many years.  Discussed with patient that I recommend that we treat his underlying anxiety and have him follow-up in 1 month and we will recheck his blood pressure at that time.  I would like him to bring the blood pressure cuff from home to do [id number] comparison as well.                Follow Up     Return in about 6 months (around 11/26/2022).    Patient was given instructions and counseling regarding his condition or for health maintenance advice. Please see specific information pulled into the AVS if appropriate.     Vciki Crews, APRN  05/26/2022

## 2022-05-26 NOTE — ASSESSMENT & PLAN NOTE
Discussed with patient that his blood pressure is normal upon manual recheck in office today, patient states that the blood pressure cuff he has been checking with his home they have had for many years.  Discussed with patient that I recommend that we treat his underlying anxiety and have him follow-up in 1 month and we will recheck his blood pressure at that time.  I would like him to bring the blood pressure cuff from home to do [id number] comparison as well.

## 2022-06-01 ENCOUNTER — HOSPITAL ENCOUNTER (OUTPATIENT)
Dept: ULTRASOUND IMAGING | Facility: HOSPITAL | Age: 42
Discharge: HOME | End: 2022-06-01
Admitting: NURSE PRACTITIONER

## 2022-06-01 ENCOUNTER — CLINICAL SUPPORT (OUTPATIENT)
Dept: FAMILY MEDICINE CLINIC | Facility: CLINIC | Age: 42
End: 2022-06-01

## 2022-06-01 ENCOUNTER — HOSPITAL ENCOUNTER (EMERGENCY)
Facility: HOSPITAL | Age: 42
Discharge: HOME | End: 2022-06-01
Attending: EMERGENCY MEDICINE | Admitting: EMERGENCY MEDICINE

## 2022-06-01 ENCOUNTER — TELEPHONE (OUTPATIENT)
Dept: FAMILY MEDICINE CLINIC | Facility: CLINIC | Age: 42
End: 2022-06-01

## 2022-06-01 ENCOUNTER — APPOINTMENT (OUTPATIENT)
Dept: GENERAL RADIOLOGY | Facility: HOSPITAL | Age: 42
End: 2022-06-01

## 2022-06-01 VITALS — DIASTOLIC BLOOD PRESSURE: 94 MMHG | SYSTOLIC BLOOD PRESSURE: 142 MMHG

## 2022-06-01 VITALS
TEMPERATURE: 98.1 F | SYSTOLIC BLOOD PRESSURE: 144 MMHG | BODY MASS INDEX: 23.98 KG/M2 | DIASTOLIC BLOOD PRESSURE: 103 MMHG | WEIGHT: 207.23 LBS | HEIGHT: 78 IN | OXYGEN SATURATION: 99 % | HEART RATE: 50 BPM | RESPIRATION RATE: 18 BRPM

## 2022-06-01 DIAGNOSIS — R42 LIGHTHEADEDNESS: Primary | ICD-10-CM

## 2022-06-01 DIAGNOSIS — T50.905A MEDICATION SIDE EFFECT, INITIAL ENCOUNTER: ICD-10-CM

## 2022-06-01 DIAGNOSIS — R22.2 SUBCUTANEOUS NODULE OF ABDOMINAL WALL: ICD-10-CM

## 2022-06-01 DIAGNOSIS — R22.2 SUBCUTANEOUS NODULE OF ABDOMINAL WALL: Primary | ICD-10-CM

## 2022-06-01 LAB
ALBUMIN SERPL-MCNC: 4.8 G/DL (ref 3.5–5.2)
ALBUMIN/GLOB SERPL: 2.1 G/DL
ALP SERPL-CCNC: 82 U/L (ref 39–117)
ALT SERPL W P-5'-P-CCNC: 15 U/L (ref 1–41)
ANION GAP SERPL CALCULATED.3IONS-SCNC: 11.8 MMOL/L (ref 5–15)
AST SERPL-CCNC: 14 U/L (ref 1–40)
BASOPHILS # BLD AUTO: 0.07 10*3/MM3 (ref 0–0.2)
BASOPHILS NFR BLD AUTO: 0.8 % (ref 0–1.5)
BILIRUB SERPL-MCNC: 0.4 MG/DL (ref 0–1.2)
BILIRUB UR QL STRIP: NEGATIVE
BUN SERPL-MCNC: 19 MG/DL (ref 6–20)
BUN/CREAT SERPL: 18.1 (ref 7–25)
CALCIUM SPEC-SCNC: 9.9 MG/DL (ref 8.6–10.5)
CHLORIDE SERPL-SCNC: 103 MMOL/L (ref 98–107)
CLARITY UR: CLEAR
CO2 SERPL-SCNC: 24.2 MMOL/L (ref 22–29)
COLOR UR: YELLOW
CREAT SERPL-MCNC: 1.05 MG/DL (ref 0.76–1.27)
DEPRECATED RDW RBC AUTO: 43.6 FL (ref 37–54)
EGFRCR SERPLBLD CKD-EPI 2021: 91.5 ML/MIN/1.73
EOSINOPHIL # BLD AUTO: 0.36 10*3/MM3 (ref 0–0.4)
EOSINOPHIL NFR BLD AUTO: 4.2 % (ref 0.3–6.2)
ERYTHROCYTE [DISTWIDTH] IN BLOOD BY AUTOMATED COUNT: 13.7 % (ref 12.3–15.4)
GLOBULIN UR ELPH-MCNC: 2.3 GM/DL
GLUCOSE SERPL-MCNC: 91 MG/DL (ref 65–99)
GLUCOSE UR STRIP-MCNC: NEGATIVE MG/DL
HCT VFR BLD AUTO: 39.7 % (ref 37.5–51)
HGB BLD-MCNC: 13.7 G/DL (ref 13–17.7)
HGB UR QL STRIP.AUTO: NEGATIVE
HOLD SPECIMEN: NORMAL
HOLD SPECIMEN: NORMAL
IMM GRANULOCYTES # BLD AUTO: 0.02 10*3/MM3 (ref 0–0.05)
IMM GRANULOCYTES NFR BLD AUTO: 0.2 % (ref 0–0.5)
KETONES UR QL STRIP: NEGATIVE
LEUKOCYTE ESTERASE UR QL STRIP.AUTO: NEGATIVE
LYMPHOCYTES # BLD AUTO: 1.83 10*3/MM3 (ref 0.7–3.1)
LYMPHOCYTES NFR BLD AUTO: 21.3 % (ref 19.6–45.3)
MAGNESIUM SERPL-MCNC: 2 MG/DL (ref 1.6–2.6)
MCH RBC QN AUTO: 30.1 PG (ref 26.6–33)
MCHC RBC AUTO-ENTMCNC: 34.5 G/DL (ref 31.5–35.7)
MCV RBC AUTO: 87.3 FL (ref 79–97)
MONOCYTES # BLD AUTO: 0.65 10*3/MM3 (ref 0.1–0.9)
MONOCYTES NFR BLD AUTO: 7.6 % (ref 5–12)
NEUTROPHILS NFR BLD AUTO: 5.66 10*3/MM3 (ref 1.7–7)
NEUTROPHILS NFR BLD AUTO: 65.9 % (ref 42.7–76)
NITRITE UR QL STRIP: NEGATIVE
NRBC BLD AUTO-RTO: 0 /100 WBC (ref 0–0.2)
PH UR STRIP.AUTO: 5.5 [PH] (ref 5–8)
PLATELET # BLD AUTO: 318 10*3/MM3 (ref 140–450)
PMV BLD AUTO: 10 FL (ref 6–12)
POTASSIUM SERPL-SCNC: 4.3 MMOL/L (ref 3.5–5.2)
PROT SERPL-MCNC: 7.1 G/DL (ref 6–8.5)
PROT UR QL STRIP: NEGATIVE
QT INTERVAL: 397 MS
RBC # BLD AUTO: 4.55 10*6/MM3 (ref 4.14–5.8)
SODIUM SERPL-SCNC: 139 MMOL/L (ref 136–145)
SP GR UR STRIP: 1.03 (ref 1–1.03)
TROPONIN T SERPL-MCNC: <0.01 NG/ML (ref 0–0.03)
UROBILINOGEN UR QL STRIP: NORMAL
WBC NRBC COR # BLD: 8.59 10*3/MM3 (ref 3.4–10.8)
WHOLE BLOOD HOLD COAG: NORMAL
WHOLE BLOOD HOLD SPECIMEN: NORMAL

## 2022-06-01 PROCEDURE — 84484 ASSAY OF TROPONIN QUANT: CPT

## 2022-06-01 PROCEDURE — 93005 ELECTROCARDIOGRAM TRACING: CPT

## 2022-06-01 PROCEDURE — 83735 ASSAY OF MAGNESIUM: CPT

## 2022-06-01 PROCEDURE — 80053 COMPREHEN METABOLIC PANEL: CPT

## 2022-06-01 PROCEDURE — 36415 COLL VENOUS BLD VENIPUNCTURE: CPT

## 2022-06-01 PROCEDURE — 85025 COMPLETE CBC W/AUTO DIFF WBC: CPT

## 2022-06-01 PROCEDURE — 76999 ECHO EXAMINATION PROCEDURE: CPT

## 2022-06-01 PROCEDURE — 99283 EMERGENCY DEPT VISIT LOW MDM: CPT

## 2022-06-01 PROCEDURE — 71045 X-RAY EXAM CHEST 1 VIEW: CPT

## 2022-06-01 PROCEDURE — 93005 ELECTROCARDIOGRAM TRACING: CPT | Performed by: EMERGENCY MEDICINE

## 2022-06-01 PROCEDURE — 81003 URINALYSIS AUTO W/O SCOPE: CPT

## 2022-06-01 RX ORDER — IBUPROFEN 400 MG/1
800 TABLET ORAL ONCE
Status: COMPLETED | OUTPATIENT
Start: 2022-06-01 | End: 2022-06-01

## 2022-06-01 RX ORDER — SODIUM CHLORIDE 0.9 % (FLUSH) 0.9 %
10 SYRINGE (ML) INJECTION AS NEEDED
Status: DISCONTINUED | OUTPATIENT
Start: 2022-06-01 | End: 2022-06-01 | Stop reason: HOSPADM

## 2022-06-01 RX ADMIN — IBUPROFEN 800 MG: 400 TABLET, FILM COATED ORAL at 17:30

## 2022-06-01 NOTE — TELEPHONE ENCOUNTER
Fasting labs have been ordered  Maintain same meds Okay, review of your GeneSight swab results show that Effexor may be a good option.  Would you like to try this medication?

## 2022-06-01 NOTE — ED PROVIDER NOTES
Time: 4:55 PM EDT  Arrived by: private car  Chief Complaint: dizziness  History provided by: Pt  History is limited by: N/A     History of Present Illness:  Patient is a 41 y.o. male that presents to the emergency department with moderate dizziness and light-headedness. Pt recently started taking Wellbutrin. Symptoms started 3-4 days after starting the medication. He reports his anxiety has been getting worse and head is pounding. Headache is constant but not severe. Pt also reports he is trying to quit smoking marijuana. Nothing improves or worsens symptoms.     Pt denies fever and cough      History provided by:  Patient      Similar Symptoms Previously: no  Recently seen: N/A      Patient Care Team  Primary Care Provider: Vicki Crews APRN    Past Medical History:     Allergies   Allergen Reactions   • Penicillins Unknown - Low Severity     Past Medical History:   Diagnosis Date   • Bunion    • Foot pain, left    • Numbness of toes    • SOB (shortness of breath)      Past Surgical History:   Procedure Laterality Date   • BUNIONECTOMY      left, 2020   • HAND SURGERY     • VEIN SURGERY       Family History   Problem Relation Age of Onset   • Hypertension Father    • Hypertension Brother    • Diabetes Maternal Grandfather    • Diabetes Paternal Grandfather    • Cancer Neg Hx        Home Medications:  Prior to Admission medications    Medication Sig Start Date End Date Taking? Authorizing Provider   buPROPion XL (Wellbutrin XL) 150 MG 24 hr tablet Take 1 tablet by mouth Daily. 22   Vicki Crews APRN   LORazepam (Ativan) 0.5 MG tablet QD PRN 22   Vicki Crews APRN   omeprazole (priLOSEC) 20 MG capsule Take 1 capsule by mouth Daily. 22   Chika Collier APRN        Social History:   Social History     Tobacco Use   • Smoking status: Former Smoker     Quit date: 2021     Years since quittin.0   • Smokeless tobacco: Former User     Types: Chew   Vaping Use   • Vaping Use:  "Never used   Substance Use Topics   • Alcohol use: Never   • Drug use: Not Currently     Types: Marijuana     Recent travel: no     Review of Systems:  Review of Systems   Constitutional: Negative for chills, diaphoresis and fever.   HENT: Negative for ear discharge and nosebleeds.    Eyes: Negative for photophobia.   Respiratory: Negative for cough and shortness of breath.    Cardiovascular: Negative for chest pain.   Gastrointestinal: Negative for diarrhea, nausea and vomiting.   Genitourinary: Negative for dysuria.   Musculoskeletal: Negative for back pain and neck pain.   Skin: Negative for rash.   Neurological: Positive for dizziness, light-headedness and headaches.        Physical Exam:  /95   Pulse 61   Temp 98.1 °F (36.7 °C) (Oral)   Resp 18   Ht 198.1 cm (78\")   Wt 94 kg (207 lb 3.7 oz)   SpO2 96%   BMI 23.95 kg/m²     Physical Exam  Vitals and nursing note reviewed.   Constitutional:       General: He is not in acute distress.     Appearance: Normal appearance.   HENT:      Head: Normocephalic and atraumatic.      Nose: Nose normal.   Eyes:      General: No scleral icterus.  Cardiovascular:      Rate and Rhythm: Normal rate and regular rhythm.      Heart sounds: Normal heart sounds.   Pulmonary:      Effort: Pulmonary effort is normal. No respiratory distress.      Breath sounds: Normal breath sounds.   Abdominal:      Palpations: Abdomen is soft.      Tenderness: There is no abdominal tenderness.   Musculoskeletal:         General: Normal range of motion.      Cervical back: Neck supple.      Right lower leg: No edema.      Left lower leg: No edema.   Skin:     General: Skin is warm and dry.   Neurological:      General: No focal deficit present.      Mental Status: He is alert and oriented to person, place, and time.      Sensory: No sensory deficit.      Motor: No weakness.                Medications in the Emergency Department:  Medications   sodium chloride 0.9 % flush 10 mL (has no " administration in time range)   ibuprofen (ADVIL,MOTRIN) tablet 800 mg (800 mg Oral Given 6/1/22 1730)        Labs  Lab Results (last 24 hours)     Procedure Component Value Units Date/Time    CBC & Differential [[id number]]  (Normal) Collected: 06/01/22 1533    Specimen: Blood Updated: 06/01/22 1606    Narrative:      The following orders were created for panel order CBC & Differential.  Procedure                               Abnormality         Status                     ---------                               -----------         ------                     CBC Auto Differential[[id number]]        Normal              Final result                 Please view results for these tests on the individual orders.    Comprehensive Metabolic Panel [[id number]] Collected: 06/01/22 1533    Specimen: Blood Updated: 06/01/22 1625     Glucose 91 mg/dL      BUN 19 mg/dL      Creatinine 1.05 mg/dL      Sodium 139 mmol/L      Potassium 4.3 mmol/L      Chloride 103 mmol/L      CO2 24.2 mmol/L      Calcium 9.9 mg/dL      Total Protein 7.1 g/dL      Albumin 4.80 g/dL      ALT (SGPT) 15 U/L      AST (SGOT) 14 U/L      Alkaline Phosphatase 82 U/L      Total Bilirubin 0.4 mg/dL      Globulin 2.3 gm/dL      A/G Ratio 2.1 g/dL      BUN/Creatinine Ratio 18.1     Anion Gap 11.8 mmol/L      eGFR 91.5 mL/min/1.73      Comment: National Kidney Foundation and American Society of Nephrology (ASN) Task Force recommended calculation based on the Chronic Kidney Disease Epidemiology Collaboration (CKD-EPI) equation refit without adjustment for race.       Narrative:      GFR Normal >60  Chronic Kidney Disease <60  Kidney Failure <15      Troponin [[id number]]  (Normal) Collected: 06/01/22 1533    Specimen: Blood Updated: 06/01/22 1625     Troponin T <0.010 ng/mL     Narrative:      Troponin T Reference Range:  <= 0.03 ng/mL-   Negative for AMI  >0.03 ng/mL-     Abnormal for myocardial necrosis.  Clinicians would have to utilize clinical acumen, EKG,  Troponin and serial changes to determine if it is an Acute Myocardial Infarction or myocardial injury due to an underlying chronic condition.       Results may be falsely decreased if patient taking Biotin.      Magnesium [[id number]]  (Normal) Collected: 06/01/22 1533    Specimen: Blood Updated: 06/01/22 1625     Magnesium 2.0 mg/dL     CBC Auto Differential [[id number]]  (Normal) Collected: 06/01/22 1533    Specimen: Blood Updated: 06/01/22 1606     WBC 8.59 10*3/mm3      RBC 4.55 10*6/mm3      Hemoglobin 13.7 g/dL      Hematocrit 39.7 %      MCV 87.3 fL      MCH 30.1 pg      MCHC 34.5 g/dL      RDW 13.7 %      RDW-SD 43.6 fl      MPV 10.0 fL      Platelets 318 10*3/mm3      Neutrophil % 65.9 %      Lymphocyte % 21.3 %      Monocyte % 7.6 %      Eosinophil % 4.2 %      Basophil % 0.8 %      Immature Grans % 0.2 %      Neutrophils, Absolute 5.66 10*3/mm3      Lymphocytes, Absolute 1.83 10*3/mm3      Monocytes, Absolute 0.65 10*3/mm3      Eosinophils, Absolute 0.36 10*3/mm3      Basophils, Absolute 0.07 10*3/mm3      Immature Grans, Absolute 0.02 10*3/mm3      nRBC 0.0 /100 WBC     Urinalysis With Microscopic If Indicated (No Culture) - Urine, Clean Catch [[id number]]  (Normal) Collected: 06/01/22 1618    Specimen: Urine, Clean Catch Updated: 06/01/22 1626     Color, UA Yellow     Appearance, UA Clear     pH, UA 5.5     Specific Gravity, UA 1.026     Glucose, UA Negative     Ketones, UA Negative     Bilirubin, UA Negative     Blood, UA Negative     Protein, UA Negative     Leuk Esterase, UA Negative     Nitrite, UA Negative     Urobilinogen, UA 1.0 E.U./dL    Narrative:      Urine microscopic not indicated.           Imaging:  XR Chest 1 View    Result Date: 6/1/2022  PROCEDURE: XR CHEST 1 VW  COMPARISON: [facility], CR, XR CHEST 2 VW, 11/23/2021, 9:29.  INDICATIONS: Weak/Dizzy/AMS triage protocol  FINDINGS:  The heart is normal in size.  The lungs are well-expanded and free of infiltrates.  Bony  structures appear intact.       No active disease is seen.       PANCHITO PATEL MD       Electronically Signed and Approved By: PANCHITO PATEL MD on 6/01/2022 at 17:00             US Soft Tissue    Result Date: 6/1/2022  PROCEDURE: US SOFT TISSUE  COMPARISON: None  INDICATIONS: subcutaneous nodule left side upper abd  FINDINGS: In the left mid abdomen, there is a 0.8 x 0.8 by 0.9 centimeter slightly complex cystic lesion located in the subcutaneous fat.  There is increased through transmission.  No internal vascularity.        1. In the area palpable concern in the left mid abdomen, there is a complex cystic nodule in the subcutaneous fat measuring up to 0.9 centimeters.  No internal vascularity is seen.  Correlate with history and exam.      OMI HUMPHREYS MD       Electronically Signed and Approved By: OMI HUMPHREYS MD on 6/01/2022 at 12:00               Procedures:  Procedures    Progress  ED Course as of 06/01/22 1823   Wed Jun 01, 2022   1648 EKG interpretation: Normal sinus rhythm, heart rate 59, normal PA and QT intervals, normal QRS duration, normal axis, normal ST segment changes with no acute ischemia. [RP]      ED Course User Index  [RP] Dayne Atkinson MD                            Medical Decision Making:  MDM  Number of Diagnoses or Management Options  Lightheadedness: new and requires workup  Medication side effect, initial encounter: new and requires workup     Amount and/or Complexity of Data Reviewed  Clinical lab tests: reviewed  Tests in the medicine section of CPT®: reviewed  Independent visualization of images, tracings, or specimens: yes    Risk of Complications, Morbidity, and/or Mortality  Presenting problems: moderate  Management options: low    Patient Progress  Patient progress: stable       Final diagnoses:   Lightheadedness   Medication side effect, initial encounter        Disposition:  ED Disposition     ED Disposition   Discharge    Condition   Stable    Comment   --              Documentation assistance provided by NUBIA HAMMONDS acting as scribe for Dayne Atkinson MD. Information recorded by the scribe was done at my direction and has been verified and validated by me.        Nubia Hammonds  06/01/22 1717       Nubia Hammonds  06/01/22 1717       Dayne Atkinson MD  06/01/22 1582

## 2022-06-01 NOTE — TELEPHONE ENCOUNTER
Please call patient, we can go ahead and switch him to something different if he would like, also if he is around and can stop by the office for a blood pressure check that would be great.

## 2022-06-01 NOTE — TELEPHONE ENCOUNTER
Caller: Asad Sutton    Relationship: Self    Best call back number: [phone]    Who are you requesting to speak with (clinical staff, provider,  specific staff member): MEDICAL STAFF    What was the call regarding: PATIENT STARTED ON WELLBUTRIN AND IS HAVING SOME SIDE EFFECTS. HIS BLOOD PRESSURE GOES UP AND DOWN AND FEELS LIGHTHEADED AT TIMES. HE DOES NOT LIKE THE MEDICATION AND WOULD LIKE TO SPEAK WITH THE MEDICAL STAFF. PLEASE CALL PATIENT TO DISCUSS MEDICATION.

## 2022-06-01 NOTE — TELEPHONE ENCOUNTER
Spoke with patient, at work his BP was 144/100. He is going to stop by the office for a manual BP check. He would also like to go ahead and change the wellbutrin to something else.

## 2022-06-01 NOTE — TELEPHONE ENCOUNTER
----- Message from RAFAL Fournier sent at 6/1/2022 12:37 PM EDT -----  Looks consistent with cyst, referred to general surgery

## 2022-06-02 RX ORDER — VENLAFAXINE HYDROCHLORIDE 75 MG/1
75 CAPSULE, EXTENDED RELEASE ORAL DAILY
Qty: 30 CAPSULE | Refills: 1 | Status: SHIPPED | OUTPATIENT
Start: 2022-06-02 | End: 2022-06-08

## 2022-06-02 NOTE — TELEPHONE ENCOUNTER
Okay, discontinue the Wellbutrin and we will trial Effexor.  Taken once daily, prescription has been sent in.

## 2022-06-08 ENCOUNTER — OFFICE VISIT (OUTPATIENT)
Dept: FAMILY MEDICINE CLINIC | Facility: CLINIC | Age: 42
End: 2022-06-08

## 2022-06-08 VITALS
OXYGEN SATURATION: 99 % | DIASTOLIC BLOOD PRESSURE: 84 MMHG | HEART RATE: 68 BPM | SYSTOLIC BLOOD PRESSURE: 136 MMHG | BODY MASS INDEX: 24.07 KG/M2 | WEIGHT: 208 LBS | HEIGHT: 78 IN

## 2022-06-08 DIAGNOSIS — R42 DIZZINESS: Primary | ICD-10-CM

## 2022-06-08 DIAGNOSIS — R03.0 ELEVATED BLOOD PRESSURE READING: ICD-10-CM

## 2022-06-08 DIAGNOSIS — F41.9 ANXIETY: ICD-10-CM

## 2022-06-08 PROCEDURE — 99214 OFFICE O/P EST MOD 30 MIN: CPT | Performed by: NURSE PRACTITIONER

## 2022-06-08 NOTE — PROGRESS NOTES
Chief Complaint  Hypertension (Patient here to follow up on high blood pressure. Patient went to [facility] ER 22. He stopped taking wellbutrin. ) and Dizziness    Subjective          Asad Mervin Sutton presents to [facility] for   History of Present Illness  Patient presents today to follow-up on hypertension .  Patient went to [facility] ER 22, was complaining of dizziness and elevated blood pressure, along with headache.  Labs and chest x-ray were normal, normal EKG.  Patient discontinued his Wellbutrin thinking that it may have been a reaction to it.  QUIT SMOKING MARIJUANA 5 WEEKS AGO.  States that she has been having the dizziness since the incident.  States that it is not a spinning sensation as much as it is a generalized dizziness.  Denies any other associated symptoms.  States that he sometimes he can be walking along like when it occurred the other day, he was walking through [employer] with his wife and suddenly felt like he was going sideways and had to grab the grocery cart to steady himself.  Medical History  Past Medical History:   Diagnosis Date   • Bunion    • Foot pain, left    • Numbness of toes    • SOB (shortness of breath)      Surgical History  Past Surgical History:   Procedure Laterality Date   • BUNIONECTOMY      left,    • HAND SURGERY     • VEIN SURGERY       Social History  Social History     Socioeconomic History   • Marital status:    Tobacco Use   • Smoking status: Former Smoker     Quit date: 2021     Years since quittin.0   • Smokeless tobacco: Former User     Types: Chew   Vaping Use   • Vaping Use: Never used   Substance and Sexual Activity   • Alcohol use: Never   • Drug use: Not Currently     Types: Marijuana   • Sexual activity: Defer       Current Outpatient Medications:   •  LORazepam (Ativan) 0.5 MG tablet, QD PRN, Disp: 20 tablet, Rfl: 0  •  omeprazole (priLOSEC) 20 MG capsule, Take 1 capsule by mouth Daily., Disp: 90 capsule,  "Rfl: 1    Review of Systems     Objective     /84   Pulse 68   Ht 198.1 cm (78\")   Wt 94.3 kg (208 lb)   SpO2 99%   BMI 24.04 kg/m²     Body mass index is 24.04 kg/m².    Orthostatic blood pressures: Lying, sitting, standing- 122/78, 128/80, 126/80      Physical Exam  Vitals reviewed.   Constitutional:       Appearance: Normal appearance. He is well-developed.   HENT:      Head: Normocephalic and atraumatic.      Comments: Negative [place]-Hallpike maneuver     Right Ear: External ear normal.      Left Ear: External ear normal.   Eyes:      General: No visual field deficit.     Conjunctiva/sclera: Conjunctivae normal.      Pupils: Pupils are equal, round, and reactive to light.   Cardiovascular:      Rate and Rhythm: Normal rate and regular rhythm.      Heart sounds: No murmur heard.    No friction rub. No gallop.   Pulmonary:      Effort: Pulmonary effort is normal.      Breath sounds: Normal breath sounds. No wheezing or rhonchi.   Skin:     General: Skin is warm and dry.   Neurological:      Mental Status: He is alert and oriented to person, place, and time.      Cranial Nerves: No cranial nerve deficit or facial asymmetry.      Motor: Motor function is intact.      Coordination: Coordination is intact. Coordination normal.      Gait: Gait normal.   Psychiatric:         Mood and Affect: Mood and affect normal.         Behavior: Behavior normal.         Thought Content: Thought content normal.         Judgment: Judgment normal.         Result Review :     The following data was reviewed by: RAFAL Trejo on 06/08/2022:    CMP    CMP 11/23/21 4/1/22 6/1/22   Glucose 91 103 (A) 91   BUN 15 18 19   Creatinine 0.96 0.92 1.05   eGFR Non African Am 86     Sodium 140 139 139   Potassium 5.2 4.5 4.3   Chloride 104 104 103   Calcium 9.9 9.6 9.9   Albumin 4.50 5.00 4.80   Total Bilirubin 0.3 0.4 0.4   Alkaline Phosphatase 77 89 82   AST (SGOT) 21 19 14   ALT (SGPT) 17 21 15   (A) Abnormal value        "     CBC w/diff    CBC w/Diff 11/23/21 4/1/22 6/1/22   WBC 9.68 9.69 8.59   RBC 5.01 4.74 4.55   Hemoglobin 15.2 14.3 13.7   Hematocrit 44.5 41.3 39.7   MCV 88.8 87.1 87.3   MCH 30.3 30.2 30.1   MCHC 34.2 34.6 34.5   RDW 12.5 13.5 13.7   Platelets 331 290 318   Neutrophil Rel % 60.3 62.1 65.9   Immature Granulocyte Rel % 0.2 0.2 0.2   Lymphocyte Rel % 24.8 23.4 21.3   Monocyte Rel % 7.1 7.0 7.6   Eosinophil Rel % 7.0 (A) 6.6 (A) 4.2   Basophil Rel % 0.6 0.7 0.8   (A) Abnormal value            Lipid Panel    Lipid Panel 11/23/21   Total Cholesterol 203 (A)   Triglycerides 135   HDL Cholesterol 35 (A)   VLDL Cholesterol 25   LDL Cholesterol  143 (A)   LDL/HDL Ratio 4.03   (A) Abnormal value                               Assessment:  Diagnoses and all orders for this visit:    1. Dizziness (Primary)  -     Duplex Carotid Ultrasound CAR; Future  -     CT Head With & Without Contrast; Future    2. Anxiety  Assessment & Plan:  After discussion patient has decided to trial continuing with no medication for his anxiety at present.  States he is not even taking the as needed lorazepam.  States he wants to try to let his mind clear.      3. Elevated blood pressure reading  Assessment & Plan:  Blood pressure is normal versus very mildly elevated in the office visit today.  Discussed with patient for now would like to continue to monitor this and we will follow-up in a month                Follow Up     Return in about 4 weeks (around 7/6/2022).    Patient was given instructions and counseling regarding his condition or for health maintenance advice. Please see specific information pulled into the AVS if appropriate.     Vicki Crews, RAFAL  06/08/2022

## 2022-06-08 NOTE — ASSESSMENT & PLAN NOTE
After discussion patient has decided to trial continuing with no medication for his anxiety at present.  States he is not even taking the as needed lorazepam.  States he wants to try to let his mind clear.

## 2022-06-08 NOTE — ASSESSMENT & PLAN NOTE
Blood pressure is normal versus very mildly elevated in the office visit today.  Discussed with patient for now would like to continue to monitor this and we will follow-up in a month

## 2022-06-17 ENCOUNTER — OFFICE VISIT (OUTPATIENT)
Dept: SURGERY | Facility: CLINIC | Age: 42
End: 2022-06-17

## 2022-06-17 ENCOUNTER — PREP FOR SURGERY (OUTPATIENT)
Dept: OTHER | Facility: HOSPITAL | Age: 42
End: 2022-06-17

## 2022-06-17 VITALS — RESPIRATION RATE: 16 BRPM | WEIGHT: 214 LBS | HEIGHT: 78 IN | BODY MASS INDEX: 24.76 KG/M2

## 2022-06-17 DIAGNOSIS — R22.2 SUBCUTANEOUS MASS OF ABDOMINAL WALL: Primary | ICD-10-CM

## 2022-06-17 PROCEDURE — 99202 OFFICE O/P NEW SF 15 MIN: CPT | Performed by: SURGERY

## 2022-06-17 NOTE — PROGRESS NOTES
"Chief Complaint:  subcutaneous nodule    Primary Care Provider: Vicki Crews APRN    Referring Provider: Vicki Crews AP*    History of Present Illness  Asad Sutton is a 41 y.o. male referred by RAFAL Fournier for subcutaneous mass that has been present for about 1 or 2 years.  The mass is increased in size a small amount and the patient occasionally has pain where the mass is located.  Soft tissue U/S was done on 22.  Following are the findings:  In the left mid abdomen, there is a 0.8 x 0.8 by 0.9 centimeter slightly complex cystic lesion located in the subcutaneous fat.  There is increased through transmission.  No internal vascularity.    Allergies: Penicillins    Outpatient Medications Marked as Taking for the 22 encounter (Office Visit) with Anirudh Guillen MD   Medication Sig Dispense Refill   • omeprazole (priLOSEC) 20 MG capsule Take 1 capsule by mouth Daily. 90 capsule 1       Past Medical History:   • Bunion   • Foot pain, left   • Numbness of toes   • SOB (shortness of breath)        Past Surgical History:   • BUNIONECTOMY    left, 2020   • HAND SURGERY   • VEIN SURGERY       Family History:   Family History   Problem Relation Age of Onset   • Hypertension Father    • Hypertension Brother    • Diabetes Maternal Grandfather    • Diabetes Paternal Grandfather    • Cancer Neg Hx         Social History:  Social History     Tobacco Use   • Smoking status: Former Smoker     Quit date: 2021     Years since quittin.0   • Smokeless tobacco: Former User     Types: Chew   Substance Use Topics   • Alcohol use: Never       Objective     Vital Signs:  Resp 16   Ht 198.1 cm (78\")   Wt 97.1 kg (214 lb)   BMI 24.73 kg/m²   • Constitutional: healthy appearing, alert, no acute distress, reliable historian  • HENT:  NCAT, no visible deformities or lesions  • Eyes:  sclerae clear, conjunctivae clear, EOMI  • Neck:  normal appearance, no masses, trachea " midline  • Respiratory:  breathing not labored, respiratory effort appears normal  • Cardiovascular:  heart regular rate  • Abdomen:  soft, nontender, nondistended.  See below skin section.  • Skin and subcutaneous tissue:  Approximately 1.5 to 2 cm diameter subcutaneous mass at the mid abdomen.  Multiple tattoos including the skin when the mass is located.  • Neurologic:  no obvious motor or sensory deficits, normal gait, able to stand without difficulty, cerebellar function without any obvious abnormalities, alert & oriented x 3, speech clear  • Psychiatric:  judgment and insight intact, mood normal, affect appropriate, cooperative    Assessment:  Subcutaneous mass of abdominal wall    Plan:  Excision of subcutaneous mass of abdominal wall    Discussion: Indications, options, risks, benefits, and expected outcomes of planned surgery were discussed with the patient and he agrees to proceed.    Anirudh Guillen MD  06/17/2022    Electronically signed by Anirudh Guillen MD, 06/17/22, 10:51 AM EDT.

## 2022-07-07 ENCOUNTER — HOSPITAL ENCOUNTER (OUTPATIENT)
Dept: CARDIOLOGY | Facility: HOSPITAL | Age: 42
Discharge: HOME | End: 2022-07-07

## 2022-07-07 ENCOUNTER — HOSPITAL ENCOUNTER (OUTPATIENT)
Dept: CT IMAGING | Facility: HOSPITAL | Age: 42
Discharge: HOME | End: 2022-07-07

## 2022-07-07 DIAGNOSIS — R42 DIZZINESS: ICD-10-CM

## 2022-07-07 LAB
BH CV XLRA MEAS LEFT CAROTID BULB EDV: 22 CM/SEC
BH CV XLRA MEAS LEFT CAROTID BULB PSV: 45 CM/SEC
BH CV XLRA MEAS LEFT DIST CCA EDV: 29 CM/SEC
BH CV XLRA MEAS LEFT DIST CCA PSV: 62 CM/SEC
BH CV XLRA MEAS LEFT DIST ICA EDV: 34 CM/SEC
BH CV XLRA MEAS LEFT DIST ICA PSV: 61 CM/SEC
BH CV XLRA MEAS LEFT MID ICA EDV: 39 CM/SEC
BH CV XLRA MEAS LEFT MID ICA PSV: 73 CM/SEC
BH CV XLRA MEAS LEFT PROX CCA EDV: 25 CM/SEC
BH CV XLRA MEAS LEFT PROX CCA PSV: 74 CM/SEC
BH CV XLRA MEAS LEFT PROX ECA EDV: 17 CM/SEC
BH CV XLRA MEAS LEFT PROX ECA PSV: 58 CM/SEC
BH CV XLRA MEAS LEFT PROX ICA EDV: 32 CM/SEC
BH CV XLRA MEAS LEFT PROX ICA PSV: 58 CM/SEC
BH CV XLRA MEAS LEFT VERTEBRAL A EDV: 16 CM/SEC
BH CV XLRA MEAS LEFT VERTEBRAL A PSV: 32 CM/SEC
BH CV XLRA MEAS RIGHT CAROTID BULB EDV: 21 CM/SEC
BH CV XLRA MEAS RIGHT CAROTID BULB PSV: 60 CM/SEC
BH CV XLRA MEAS RIGHT DIST CCA EDV: 31 CM/SEC
BH CV XLRA MEAS RIGHT DIST CCA PSV: 76 CM/SEC
BH CV XLRA MEAS RIGHT DIST ICA EDV: 36 CM/SEC
BH CV XLRA MEAS RIGHT DIST ICA PSV: 60 CM/SEC
BH CV XLRA MEAS RIGHT MID ICA EDV: 34 CM/SEC
BH CV XLRA MEAS RIGHT MID ICA PSV: 65 CM/SEC
BH CV XLRA MEAS RIGHT PROX CCA EDV: 26 CM/SEC
BH CV XLRA MEAS RIGHT PROX CCA PSV: 80 CM/SEC
BH CV XLRA MEAS RIGHT PROX ECA EDV: 16 CM/SEC
BH CV XLRA MEAS RIGHT PROX ECA PSV: 68 CM/SEC
BH CV XLRA MEAS RIGHT PROX ICA EDV: 28 CM/SEC
BH CV XLRA MEAS RIGHT PROX ICA PSV: 70 CM/SEC
BH CV XLRA MEAS RIGHT VERTEBRAL A EDV: 17 CM/SEC
BH CV XLRA MEAS RIGHT VERTEBRAL A PSV: 25 CM/SEC
LEFT ARM BP: NORMAL MMHG
MAXIMAL PREDICTED HEART RATE: 178 BPM
RIGHT ARM BP: NORMAL MMHG
STRESS TARGET HR: 151 BPM

## 2022-07-07 PROCEDURE — 93880 EXTRACRANIAL BILAT STUDY: CPT

## 2022-07-07 PROCEDURE — 0 IOPAMIDOL PER 1 ML: Performed by: NURSE PRACTITIONER

## 2022-07-07 PROCEDURE — 93880 EXTRACRANIAL BILAT STUDY: CPT | Performed by: SURGERY

## 2022-07-07 PROCEDURE — 70470 CT HEAD/BRAIN W/O & W/DYE: CPT

## 2022-07-07 RX ADMIN — IOPAMIDOL 50 ML: 755 INJECTION, SOLUTION INTRAVENOUS at 08:30

## 2022-07-15 DIAGNOSIS — Z13.220 SCREENING FOR LIPID DISORDERS: Primary | ICD-10-CM

## 2022-07-15 DIAGNOSIS — I65.29 STENOSIS OF CAROTID ARTERY, UNSPECIFIED LATERALITY: ICD-10-CM

## 2022-07-15 DIAGNOSIS — R42 DIZZINESS: Primary | ICD-10-CM

## 2022-07-19 ENCOUNTER — OFFICE VISIT (OUTPATIENT)
Dept: VASCULAR SURGERY | Facility: HOSPITAL | Age: 42
End: 2022-07-19

## 2022-07-19 ENCOUNTER — LAB (OUTPATIENT)
Dept: LAB | Facility: HOSPITAL | Age: 42
End: 2022-07-19

## 2022-07-19 VITALS
SYSTOLIC BLOOD PRESSURE: 122 MMHG | TEMPERATURE: 97.8 F | RESPIRATION RATE: 18 BRPM | OXYGEN SATURATION: 98 % | HEART RATE: 67 BPM | DIASTOLIC BLOOD PRESSURE: 94 MMHG

## 2022-07-19 DIAGNOSIS — Z13.220 SCREENING FOR LIPID DISORDERS: ICD-10-CM

## 2022-07-19 DIAGNOSIS — I65.23 BILATERAL CAROTID ARTERY STENOSIS: Primary | ICD-10-CM

## 2022-07-19 LAB
CHOLEST SERPL-MCNC: 208 MG/DL (ref 0–200)
HDLC SERPL-MCNC: 41 MG/DL (ref 40–60)
LDLC SERPL CALC-MCNC: 143 MG/DL (ref 0–100)
LDLC/HDLC SERPL: 3.42 {RATIO}
TRIGL SERPL-MCNC: 134 MG/DL (ref 0–150)
VLDLC SERPL-MCNC: 24 MG/DL (ref 5–40)

## 2022-07-19 PROCEDURE — G0463 HOSPITAL OUTPT CLINIC VISIT: HCPCS | Performed by: SURGERY

## 2022-07-19 PROCEDURE — 99203 OFFICE O/P NEW LOW 30 MIN: CPT | Performed by: SURGERY

## 2022-07-19 PROCEDURE — 80061 LIPID PANEL: CPT

## 2022-07-19 PROCEDURE — 36415 COLL VENOUS BLD VENIPUNCTURE: CPT

## 2022-07-19 NOTE — PROGRESS NOTES
[facility]   HISTORY AND PHYSICAL    Patient Name: Asad Sutton  : 1980  MRN: [id number]  Primary Care Physician:  Vicki Crews APRN      Subjective   Subjective     Chief Complaint: Dizziness    HPI:    Asad Sutton is a 42 y.o. male who presents for evaluation of carotid stenosis.  He has been having significant troubles with dizziness and balance.  He underwent further work-up and was noted to have bilateral moderate carotid stenosis.  He was sent for evaluation.  He does not have any symptoms suggestive of TIA or stroke.  No symptoms of amaurosis.  He is right-handed.  He works as a .  He did have significant marijuana use and his symptoms started when he stopped using that.  He has been labeled as anxiety disorder but medications have not really helped his symptoms.    He does not smoke tobacco.  No significant alcohol use.  No other major medical problems.    Review of Systems  All systems reviewed negative except for those above.    Personal History     Past Medical History:   Diagnosis Date   • Bunion    • Foot pain, left    • Numbness of toes    • SOB (shortness of breath)        Past Surgical History:   Procedure Laterality Date   • BUNIONECTOMY      left, 2020   • HAND SURGERY     • VEIN SURGERY         Family History: family history includes Diabetes in his maternal grandfather and paternal grandfather; Hypertension in his brother and father. Otherwise pertinent FHx was reviewed and not pertinent to current issue.    Social History:  reports that he quit smoking about 13 months ago. He has quit using smokeless tobacco.  His smokeless tobacco use included chew. He reports previous drug use. Drug: Marijuana. He reports that he does not drink alcohol.    Home Medications:  Current Outpatient Medications on File Prior to Visit   Medication Sig   • omeprazole (priLOSEC) 20 MG capsule Take 1 capsule by mouth Daily.   • LORazepam (Ativan) 0.5 MG tablet QD PRN   •  [DISCONTINUED] buPROPion XL (Wellbutrin XL) 150 MG 24 hr tablet Take 1 tablet by mouth Daily.     No current facility-administered medications on file prior to visit.          Allergies:  Allergies   Allergen Reactions   • Penicillins Unknown - Low Severity       Objective   Objective     Vitals:   Temp:  [97.8 °F (36.6 °C)] 97.8 °F (36.6 °C)  Heart Rate:  [67] 67  Resp:  [18] 18  BP: (110-122)/(82-94) 122/94    Physical Exam   Patient is awake and alert.  Appears stated age.  Well-groomed and well-nourished.  No acute distress.  Regular rhythm.  Nonlabored breathing.  Palpable radial pulses.  Strong pedal pulses.  No edema.  Abdomen is soft.  No gross neurological deficits.     Result Review    Most notable findings include: Reviewed the carotid duplex study.  He has mild to moderate carotid stenosis.  By velocity criteria he is approximately 50% stenosis or less.  Vertebral arteries are patent.    Assessment & Plan   Assessment / Plan     Brief Patient Summary:  Asad Sutton is a 42 y.o. male who presents with concern for carotid artery stenosis.  He has symptoms of dizziness and imbalance.  I do not think this is related to any cerebral perfusion issues.  His stenosis is mild to moderate at the most.  I think this could be related to hyper dynamic circulation versus any severe atherosclerotic disease.  His risk factor profile is relatively well controlled.  I would not recommend any further invasive work-up for this.  I did have a long discussion with him regarding healthy lifestyle with dietary changes and also drug use.  He understands is going to work on that.    Diagnoses and all orders for this visit:    1. Bilateral carotid artery stenosis (Primary)         Plan:   I would recommend follow-up in 2 years with a carotid duplex.  Follow-up urgently if there is any symptoms or signs of stroke or TIA.  He and his wife understand and agree with the plan.        Electronically signed by SAURABH Larson,  07/19/22, 5:27 PM EDT.

## 2022-07-20 ENCOUNTER — TELEPHONE (OUTPATIENT)
Dept: FAMILY MEDICINE CLINIC | Facility: CLINIC | Age: 42
End: 2022-07-20

## 2022-07-20 DIAGNOSIS — E78.5 HYPERLIPIDEMIA, UNSPECIFIED HYPERLIPIDEMIA TYPE: Primary | ICD-10-CM

## 2022-07-20 RX ORDER — ROSUVASTATIN CALCIUM 5 MG/1
5 TABLET, COATED ORAL DAILY
Qty: 90 TABLET | Refills: 1 | Status: SHIPPED | OUTPATIENT
Start: 2022-07-20 | End: 2023-01-16

## 2022-07-20 NOTE — TELEPHONE ENCOUNTER
----- Message from RAFAL Fournier sent at 7/20/2022 10:35 AM EDT -----  Recommend we go ahead and start a low-dose statin, cholesterol is essentially unchanged as compared to previous 6 months ago.  Recommend Crestor 5 mg 1 tab p.o. nightly #90 with 1 refill.  We will recheck lipids at 6-month follow-up

## 2022-07-29 ENCOUNTER — HOSPITAL ENCOUNTER (OUTPATIENT)
Facility: HOSPITAL | Age: 42
Setting detail: HOSPITAL OUTPATIENT SURGERY
Discharge: HOME | End: 2022-07-29
Attending: SURGERY | Admitting: SURGERY

## 2022-07-29 ENCOUNTER — ANESTHESIA EVENT (OUTPATIENT)
Dept: PERIOP | Facility: HOSPITAL | Age: 42
End: 2022-07-29

## 2022-07-29 ENCOUNTER — ANESTHESIA (OUTPATIENT)
Dept: PERIOP | Facility: HOSPITAL | Age: 42
End: 2022-07-29

## 2022-07-29 VITALS
HEART RATE: 75 BPM | DIASTOLIC BLOOD PRESSURE: 60 MMHG | RESPIRATION RATE: 20 BRPM | BODY MASS INDEX: 25.35 KG/M2 | HEIGHT: 78 IN | SYSTOLIC BLOOD PRESSURE: 111 MMHG | WEIGHT: 219.14 LBS | TEMPERATURE: 98 F | OXYGEN SATURATION: 99 %

## 2022-07-29 DIAGNOSIS — R22.2 SUBCUTANEOUS MASS OF ABDOMINAL WALL: ICD-10-CM

## 2022-07-29 PROCEDURE — 25010000002 PROPOFOL 10 MG/ML EMULSION: Performed by: NURSE ANESTHETIST, CERTIFIED REGISTERED

## 2022-07-29 PROCEDURE — 11402 EXC TR-EXT B9+MARG 1.1-2 CM: CPT | Performed by: SURGERY

## 2022-07-29 PROCEDURE — 25010000002 FENTANYL CITRATE (PF) 50 MCG/ML SOLUTION: Performed by: NURSE ANESTHETIST, CERTIFIED REGISTERED

## 2022-07-29 PROCEDURE — 25010000002 ONDANSETRON PER 1 MG: Performed by: NURSE ANESTHETIST, CERTIFIED REGISTERED

## 2022-07-29 PROCEDURE — 88304 TISSUE EXAM BY PATHOLOGIST: CPT | Performed by: SURGERY

## 2022-07-29 PROCEDURE — 25010000002 MIDAZOLAM PER 1 MG: Performed by: STUDENT IN AN ORGANIZED HEALTH CARE EDUCATION/TRAINING PROGRAM

## 2022-07-29 PROCEDURE — 25010000002 DEXAMETHASONE PER 1 MG: Performed by: NURSE ANESTHETIST, CERTIFIED REGISTERED

## 2022-07-29 RX ORDER — ONDANSETRON 2 MG/ML
4 INJECTION INTRAMUSCULAR; INTRAVENOUS ONCE AS NEEDED
Status: DISCONTINUED | OUTPATIENT
Start: 2022-07-29 | End: 2022-07-29 | Stop reason: HOSPADM

## 2022-07-29 RX ORDER — FENTANYL CITRATE 50 UG/ML
INJECTION, SOLUTION INTRAMUSCULAR; INTRAVENOUS AS NEEDED
Status: DISCONTINUED | OUTPATIENT
Start: 2022-07-29 | End: 2022-07-29 | Stop reason: SURG

## 2022-07-29 RX ORDER — CHLORAL HYDRATE 500 MG
1000 CAPSULE ORAL
COMMUNITY

## 2022-07-29 RX ORDER — LIDOCAINE HYDROCHLORIDE 20 MG/ML
INJECTION, SOLUTION EPIDURAL; INFILTRATION; INTRACAUDAL; PERINEURAL AS NEEDED
Status: DISCONTINUED | OUTPATIENT
Start: 2022-07-29 | End: 2022-07-29 | Stop reason: SURG

## 2022-07-29 RX ORDER — ACETAMINOPHEN 500 MG
1000 TABLET ORAL ONCE
Status: COMPLETED | OUTPATIENT
Start: 2022-07-29 | End: 2022-07-29

## 2022-07-29 RX ORDER — DEXAMETHASONE SODIUM PHOSPHATE 4 MG/ML
INJECTION, SOLUTION INTRA-ARTICULAR; INTRALESIONAL; INTRAMUSCULAR; INTRAVENOUS; SOFT TISSUE AS NEEDED
Status: DISCONTINUED | OUTPATIENT
Start: 2022-07-29 | End: 2022-07-29 | Stop reason: SURG

## 2022-07-29 RX ORDER — MIDAZOLAM HYDROCHLORIDE 1 MG/ML
2 INJECTION INTRAMUSCULAR; INTRAVENOUS ONCE
Status: COMPLETED | OUTPATIENT
Start: 2022-07-29 | End: 2022-07-29

## 2022-07-29 RX ORDER — PROMETHAZINE HYDROCHLORIDE 12.5 MG/1
25 TABLET ORAL ONCE AS NEEDED
Status: DISCONTINUED | OUTPATIENT
Start: 2022-07-29 | End: 2022-07-29 | Stop reason: HOSPADM

## 2022-07-29 RX ORDER — OXYCODONE HYDROCHLORIDE 5 MG/1
5 TABLET ORAL
Status: DISCONTINUED | OUTPATIENT
Start: 2022-07-29 | End: 2022-07-29 | Stop reason: HOSPADM

## 2022-07-29 RX ORDER — BUPIVACAINE HYDROCHLORIDE 2.5 MG/ML
INJECTION, SOLUTION EPIDURAL; INFILTRATION; INTRACAUDAL AS NEEDED
Status: DISCONTINUED | OUTPATIENT
Start: 2022-07-29 | End: 2022-07-29 | Stop reason: HOSPADM

## 2022-07-29 RX ORDER — PROMETHAZINE HYDROCHLORIDE 25 MG/1
25 SUPPOSITORY RECTAL ONCE AS NEEDED
Status: DISCONTINUED | OUTPATIENT
Start: 2022-07-29 | End: 2022-07-29 | Stop reason: HOSPADM

## 2022-07-29 RX ORDER — MEPERIDINE HYDROCHLORIDE 25 MG/ML
12.5 INJECTION INTRAMUSCULAR; INTRAVENOUS; SUBCUTANEOUS
Status: DISCONTINUED | OUTPATIENT
Start: 2022-07-29 | End: 2022-07-29 | Stop reason: HOSPADM

## 2022-07-29 RX ORDER — SODIUM CHLORIDE, SODIUM LACTATE, POTASSIUM CHLORIDE, CALCIUM CHLORIDE 600; 310; 30; 20 MG/100ML; MG/100ML; MG/100ML; MG/100ML
9 INJECTION, SOLUTION INTRAVENOUS CONTINUOUS PRN
Status: DISCONTINUED | OUTPATIENT
Start: 2022-07-29 | End: 2022-07-29 | Stop reason: HOSPADM

## 2022-07-29 RX ORDER — PROPOFOL 10 MG/ML
VIAL (ML) INTRAVENOUS AS NEEDED
Status: DISCONTINUED | OUTPATIENT
Start: 2022-07-29 | End: 2022-07-29 | Stop reason: SURG

## 2022-07-29 RX ADMIN — PROPOFOL 200 MG: 10 INJECTION, EMULSION INTRAVENOUS at 09:11

## 2022-07-29 RX ADMIN — MIDAZOLAM HYDROCHLORIDE 2 MG: 1 INJECTION, SOLUTION INTRAMUSCULAR; INTRAVENOUS at 08:36

## 2022-07-29 RX ADMIN — DEXAMETHASONE SODIUM PHOSPHATE 4 MG: 4 INJECTION, SOLUTION INTRA-ARTICULAR; INTRALESIONAL; INTRAMUSCULAR; INTRAVENOUS; SOFT TISSUE at 09:11

## 2022-07-29 RX ADMIN — ONDANSETRON 4 MG: 2 INJECTION INTRAMUSCULAR; INTRAVENOUS at 09:18

## 2022-07-29 RX ADMIN — LIDOCAINE HYDROCHLORIDE 50 MG: 20 INJECTION, SOLUTION EPIDURAL; INFILTRATION; INTRACAUDAL; PERINEURAL at 09:16

## 2022-07-29 RX ADMIN — FENTANYL CITRATE 100 MCG: 50 INJECTION, SOLUTION INTRAMUSCULAR; INTRAVENOUS at 09:11

## 2022-07-29 RX ADMIN — SODIUM CHLORIDE, POTASSIUM CHLORIDE, SODIUM LACTATE AND CALCIUM CHLORIDE 9 ML/HR: 600; 310; 30; 20 INJECTION, SOLUTION INTRAVENOUS at 07:28

## 2022-07-29 RX ADMIN — LIDOCAINE HYDROCHLORIDE 50 MG: 20 INJECTION, SOLUTION EPIDURAL; INFILTRATION; INTRACAUDAL; PERINEURAL at 09:11

## 2022-07-29 RX ADMIN — ACETAMINOPHEN 1000 MG: 500 TABLET ORAL at 07:29

## 2022-07-29 NOTE — ANESTHESIA PREPROCEDURE EVALUATION
Anesthesia Evaluation     Patient summary reviewed and Nursing notes reviewed   history of anesthetic complications (increased anesthetic requirement):  NPO Solid Status: > 8 hours  NPO Liquid Status: > 2 hours           Airway   Mallampati: III  TM distance: >3 FB  Possible difficult intubation  Dental          Pulmonary - normal exam   (+) a smoker Former,   Cardiovascular - normal exam  Exercise tolerance: good (4-7 METS)    ECG reviewed    (+) hyperlipidemia,     ROS comment: Echo:  Interpretation Summary    · Calculated left ventricular EF = 62% Estimated left ventricular EF was in agreement with the calculated left ventricular EF.  · Left ventricular diastolic function was normal.  · Aortic valve morphology was not well visualized but appears to be tricuspid.  · There is no hemodynamically significant valvular pathology.  · Mild dilation of the aortic root is present measuring 3.9 cm.    Stress test: no ischemia    Neuro/Psych  (+) psychiatric history Anxiety,    GI/Hepatic/Renal/Endo    (+)  GERD,      Musculoskeletal (-) negative ROS    Abdominal  - normal exam   Substance History   (+) drug use (marijuana)     OB/GYN negative ob/gyn ROS         Other - negative ROS       ROS/Med Hx Other:   Subcutaneous mass of abdominal wall             Phys Exam Other: 0.5cm firm subcutaneous mass              Anesthesia Plan    ASA 2     MAC and general     (Patient understands anesthesia not responsible for dental damage.)  intravenous induction     Anesthetic plan, risks, benefits, and alternatives have been provided, discussed and informed consent has been obtained with: patient.    Plan discussed with CRNA.        CODE STATUS:

## 2022-07-29 NOTE — ANESTHESIA POSTPROCEDURE EVALUATION
Patient: Asad Sutton    Procedure Summary     Date: 07/29/22 Room / Location: [facility] OR  / [facility]    Anesthesia Start: 0907 Anesthesia Stop: 0938    Procedure: Excision of subcutaneous mass from abdominal wall (Left Abdomen) Diagnosis:       Subcutaneous mass of abdominal wall      (Subcutaneous mass of abdominal wall [R22.2])    Surgeons: Anirudh Guillen MD Provider: Keely Stewart DO    Anesthesia Type: MAC, general ASA Status: 2          Anesthesia Type: MAC, general    Vitals  Vitals Value Taken Time   /60 07/29/22 1003   Temp 36.7 °C (98 °F) 07/29/22 1001   Pulse 51 07/29/22 1009   Resp 20 07/29/22 1001   SpO2 100 % 07/29/22 1009   Vitals shown include unvalidated device data.        Post Anesthesia Care and Evaluation    Patient location during evaluation: bedside  Patient participation: complete - patient participated  Level of consciousness: awake  Pain management: adequate    Airway patency: patent  Anesthetic complications: No anesthetic complications  PONV Status: none  Cardiovascular status: acceptable and stable  Respiratory status: acceptable  Hydration status: acceptable    Comments: An Anesthesiologist personally participated in the most demanding procedures (including induction and emergence if applicable) in the anesthesia plan, monitored the course of anesthesia administration at frequent intervals and remained physically present and available for immediate diagnosis and treatment of emergencies.

## 2022-08-02 LAB
CYTO UR: NORMAL
LAB AP CASE REPORT: NORMAL
LAB AP CLINICAL INFORMATION: NORMAL
PATH REPORT.FINAL DX SPEC: NORMAL
PATH REPORT.GROSS SPEC: NORMAL

## 2022-10-05 ENCOUNTER — OFFICE VISIT (OUTPATIENT)
Dept: FAMILY MEDICINE CLINIC | Facility: CLINIC | Age: 42
End: 2022-10-05

## 2022-10-05 VITALS
BODY MASS INDEX: 25.34 KG/M2 | HEART RATE: 60 BPM | HEIGHT: 78 IN | SYSTOLIC BLOOD PRESSURE: 128 MMHG | OXYGEN SATURATION: 100 % | DIASTOLIC BLOOD PRESSURE: 87 MMHG | WEIGHT: 219 LBS

## 2022-10-05 DIAGNOSIS — R59.1 LYMPHADENOPATHY: ICD-10-CM

## 2022-10-05 DIAGNOSIS — K21.9 GASTROESOPHAGEAL REFLUX DISEASE WITHOUT ESOPHAGITIS: ICD-10-CM

## 2022-10-05 DIAGNOSIS — H66.90 ACUTE OTITIS MEDIA, UNSPECIFIED OTITIS MEDIA TYPE: ICD-10-CM

## 2022-10-05 DIAGNOSIS — H61.23 BILATERAL IMPACTED CERUMEN: Primary | ICD-10-CM

## 2022-10-05 PROCEDURE — 99214 OFFICE O/P EST MOD 30 MIN: CPT | Performed by: NURSE PRACTITIONER

## 2022-10-05 PROCEDURE — 69209 REMOVE IMPACTED EAR WAX UNI: CPT | Performed by: NURSE PRACTITIONER

## 2022-10-05 RX ORDER — LANSOPRAZOLE 30 MG/1
30 CAPSULE, DELAYED RELEASE ORAL DAILY
Qty: 90 CAPSULE | Refills: 1 | Status: SHIPPED | OUTPATIENT
Start: 2022-10-05 | End: 2023-04-03

## 2022-10-05 RX ORDER — AZITHROMYCIN 250 MG/1
TABLET, FILM COATED ORAL
Qty: 6 TABLET | Refills: 0 | Status: SHIPPED | OUTPATIENT
Start: 2022-10-05 | End: 2022-11-02

## 2022-10-05 NOTE — PROGRESS NOTES
"Chief Complaint  Nodule behind ear   SUBJECTIVE  Asad Sutton presents to [facility]      Patient presents today with complaints of tender nodule behind his right ear.  States that just popped up a couple of days ago, is not really having any other symptoms at present.    Patient is also complaining of poorly controlled acid reflux, was previously taking omeprazole but is not seem to be controlling the symptoms anymore, patient states he does try to avoid spicy foods is much as he can.    History of Present Illness  Past Medical History:   Diagnosis Date   • GERD (gastroesophageal reflux disease)    • Hyperlipidemia    • Subcutaneous mass of abdominal wall       Family History   Problem Relation Age of Onset   • Hypertension Father    • Hypertension Brother    • Diabetes Maternal Grandfather    • Diabetes Paternal Grandfather    • Cancer Neg Hx       Past Surgical History:   Procedure Laterality Date   • BUNIONECTOMY      left, 2020   • EXCISION LESION Left 7/29/2022    Procedure: Excision of subcutaneous mass from abdominal wall;  Surgeon: Anirudh Guillen MD;  [place] [facility] [place] [facility]  Service: General;  Laterality: Left;   • HAND SURGERY     • VEIN SURGERY          Current Outpatient Medications:   •  Omega-3 Fatty Acids (fish oil) 1000 MG capsule capsule, Take 1,000 mg by mouth Daily With Breakfast., Disp: , Rfl:   •  rosuvastatin (Crestor) 5 MG tablet, Take 1 tablet by mouth Daily. (Patient taking differently: Take 5 mg by mouth Every Night.), Disp: 90 tablet, Rfl: 1  •  azithromycin (Zithromax Z-Kikr) 250 MG tablet, Take 2 tablets by mouth on day 1, then 1 tablet daily on days 2-5, Disp: 6 tablet, Rfl: 0  •  lansoprazole (PREVACID) 30 MG capsule, Take 1 capsule by mouth Daily., Disp: 90 capsule, Rfl: 1    OBJECTIVE  Vital Signs:   /87 (BP Location: Left arm)   Pulse 60   Ht 198.1 cm (78\")   Wt 99.3 kg (219 lb)   SpO2 100%   BMI 25.31 kg/m²    Estimated body mass " "index is 25.31 kg/m² as calculated from the following:    Height as of this encounter: 198.1 cm (78\").    Weight as of this encounter: 99.3 kg (219 lb).     Wt Readings from Last 3 Encounters:   10/05/22 99.3 kg (219 lb)   07/29/22 99.4 kg (219 lb 2.2 oz)   06/17/22 97.1 kg (214 lb)     BP Readings from Last 3 Encounters:   10/05/22 128/87   07/29/22 111/60   07/19/22 122/94       Physical Exam  Vitals reviewed.   Constitutional:       Appearance: Normal appearance. He is well-developed.   HENT:      Head: Normocephalic and atraumatic.      Right Ear: External ear normal.      Left Ear: External ear normal.      Ears:      Comments: Bilateral cerumen impactions noted, after clearance with irrigation left TM appears normal right TM is mildly erythematous with small amount of fluid noted     Mouth/Throat:      Pharynx: Posterior oropharyngeal erythema present. No oropharyngeal exudate.      Comments: Throat is mildly erythematous  Eyes:      Conjunctiva/sclera: Conjunctivae normal.      Pupils: Pupils are equal, round, and reactive to light.   Neck:      Comments: Right sided lymphadenopathy, mildly tender to palpation  Cardiovascular:      Rate and Rhythm: Normal rate and regular rhythm.      Heart sounds: No murmur heard.    No friction rub. No gallop.   Pulmonary:      Effort: Pulmonary effort is normal.      Breath sounds: Normal breath sounds. No wheezing or rhonchi.   Skin:     General: Skin is warm and dry.   Neurological:      Mental Status: He is alert and oriented to person, place, and time.      Cranial Nerves: No cranial nerve deficit.   Psychiatric:         Mood and Affect: Mood and affect normal.         Behavior: Behavior normal.         Thought Content: Thought content normal.         Judgment: Judgment normal.          Result Review        CT Head With & Without Contrast    Result Date: 7/7/2022    1. Mild left ethmoid and left frontal sinus disease. 2. No acute intracranial finding.  3. Fluid density " area in the right temporal fossa similar to prior CT from 2008 and could reflect area of encephalomalacia.  There is mild dilation of the right temporal horn suggesting volume loss similar to previous exam.     OMI HUMPHREYS MD       Electronically Signed and Approved By: OMI HUMPHREYS MD on 7/07/2022 at 16:59                The above data has been reviewed by RAFAL Trejo 10/05/2022 07:38 EDT.    Cerumen Removal    Date/Time: 10/5/2022 5:14 PM  Performed by: Henny Greenberg MA  Authorized by: Vicki Crews APRN   Location details: left ear and right ear  Patient tolerance: patient tolerated the procedure well with no immediate complications  Procedure type: irrigation          Patient Care Team:  Vicki Crews APRN as PCP - General (Nurse Practitioner)  Henny Greenberg MA as Medical Assistant         ASSESSMENT & PLAN    Diagnoses and all orders for this visit:    1. Bilateral impacted cerumen (Primary)  -     Ear Cerumen Removal    2. Gastroesophageal reflux disease without esophagitis  Comments:  Symptoms poorly controlled at present, will trial switching to Prevacid, follow-up if no improvement  Orders:  -     lansoprazole (PREVACID) 30 MG capsule; Take 1 capsule by mouth Daily.  Dispense: 90 capsule; Refill: 1    3. Lymphadenopathy  Comments:  If nodule and tenderness does not resolve within 2 weeks follow-up for ultrasound    4. Acute otitis media, unspecified otitis media type  -     azithromycin (Zithromax Z-Kirk) 250 MG tablet; Take 2 tablets by mouth on day 1, then 1 tablet daily on days 2-5  Dispense: 6 tablet; Refill: 0         Tobacco Use: Medium Risk   • Smoking Tobacco Use: Former Smoker   • Smokeless Tobacco Use: Former User       Follow Up     Return if symptoms worsen or fail to improve.        Patient was given instructions and counseling regarding his condition or for health maintenance advice. Please see specific information pulled into the AVS if appropriate.   I  have reviewed information obtained and documented by others and I have confirmed the accuracy of this documented note.    Vicki Crews APRN

## 2022-10-11 DIAGNOSIS — R10.10 UPPER ABDOMINAL PAIN OF UNKNOWN ETIOLOGY: ICD-10-CM

## 2022-10-11 RX ORDER — OMEPRAZOLE 20 MG/1
CAPSULE, DELAYED RELEASE ORAL
Qty: 90 CAPSULE | Refills: 1 | OUTPATIENT
Start: 2022-10-11

## 2022-11-02 ENCOUNTER — OFFICE VISIT (OUTPATIENT)
Dept: FAMILY MEDICINE CLINIC | Facility: CLINIC | Age: 42
End: 2022-11-02

## 2022-11-02 VITALS
HEART RATE: 50 BPM | SYSTOLIC BLOOD PRESSURE: 122 MMHG | HEIGHT: 78 IN | OXYGEN SATURATION: 99 % | WEIGHT: 225 LBS | DIASTOLIC BLOOD PRESSURE: 81 MMHG | BODY MASS INDEX: 26.03 KG/M2

## 2022-11-02 DIAGNOSIS — M54.2 NECK PAIN: Primary | ICD-10-CM

## 2022-11-02 DIAGNOSIS — M54.12 LEFT CERVICAL RADICULOPATHY: ICD-10-CM

## 2022-11-02 PROCEDURE — 96372 THER/PROPH/DIAG INJ SC/IM: CPT | Performed by: NURSE PRACTITIONER

## 2022-11-02 PROCEDURE — 99213 OFFICE O/P EST LOW 20 MIN: CPT | Performed by: NURSE PRACTITIONER

## 2022-11-02 RX ORDER — MELOXICAM 7.5 MG/1
7.5 TABLET ORAL DAILY
Qty: 30 TABLET | Refills: 1 | OUTPATIENT
Start: 2022-11-02 | End: 2022-11-18

## 2022-11-02 RX ORDER — TRIAMCINOLONE ACETONIDE 40 MG/ML
60 INJECTION, SUSPENSION INTRA-ARTICULAR; INTRAMUSCULAR ONCE
Status: COMPLETED | OUTPATIENT
Start: 2022-11-02 | End: 2022-11-02

## 2022-11-02 RX ADMIN — TRIAMCINOLONE ACETONIDE 60 MG: 40 INJECTION, SUSPENSION INTRA-ARTICULAR; INTRAMUSCULAR at 08:25

## 2022-11-02 NOTE — PROGRESS NOTES
Chief Complaint  Arm Pain (Left arm pain, patient would like kenalog shot)    SUBJECTIVE  Asad Sutton presents to [facility]     Pt presents today with c/o left arm pain and numbness radiating down from neck, onset several years but flared back up over the last couple of months.  States he has had this occur off and on over the last few years, states usually he gets a steroid shot which helps the symptoms calm back down.  He has also been going to a chiropractor but feels like that may possibly be making it worse, states that the last adjustment that he did was somewhat painful.    Patient denies any known history of injury to his neck, states that he can turn his head a certain way sometimes and can stop the numbness and tingling in his arm and hand.  Patient admits that he does at times seem to have a little less strength in his left arm and hand.     History of Present Illness  Past Medical History:   Diagnosis Date   • GERD (gastroesophageal reflux disease)    • Hyperlipidemia    • Subcutaneous mass of abdominal wall       Family History   Problem Relation Age of Onset   • Hypertension Father    • Hypertension Brother    • Diabetes Maternal Grandfather    • Diabetes Paternal Grandfather    • Cancer Neg Hx       Past Surgical History:   Procedure Laterality Date   • BUNIONECTOMY      left, 2020   • EXCISION LESION Left 7/29/2022    Procedure: Excision of subcutaneous mass from abdominal wall;  Surgeon: Anirudh Guillen MD;  Location: [facility] [place] [facility] [place];  Service: General;  Laterality: Left;   • HAND SURGERY     • VEIN SURGERY          Current Outpatient Medications:   •  lansoprazole (PREVACID) 30 MG capsule, Take 1 capsule by mouth Daily., Disp: 90 capsule, Rfl: 1  •  Omega-3 Fatty Acids (fish oil) 1000 MG capsule capsule, Take 1,000 mg by mouth Daily With Breakfast., Disp: , Rfl:   •  rosuvastatin (Crestor) 5 MG tablet, Take 1 tablet by mouth Daily. (Patient taking  "differently: Take 1 tablet by mouth Every Night.), Disp: 90 tablet, Rfl: 1  •  meloxicam (Mobic) 7.5 MG tablet, Take 1 tablet by mouth Daily., Disp: 30 tablet, Rfl: 1  No current facility-administered medications for this visit.    OBJECTIVE  Vital Signs:   /81   Pulse 50   Ht 198.1 cm (78\")   Wt 102 kg (225 lb)   SpO2 99%   BMI 26.00 kg/m²    Estimated body mass index is 26 kg/m² as calculated from the following:    Height as of this encounter: 198.1 cm (78\").    Weight as of this encounter: 102 kg (225 lb).     Wt Readings from Last 3 Encounters:   11/02/22 102 kg (225 lb)   10/05/22 99.3 kg (219 lb)   07/29/22 99.4 kg (219 lb 2.2 oz)     BP Readings from Last 3 Encounters:   11/02/22 122/81   10/05/22 128/87   07/29/22 111/60       Physical Exam  Vitals reviewed.   Constitutional:       Appearance: Normal appearance. He is well-developed.   HENT:      Head: Normocephalic and atraumatic.      Right Ear: External ear normal.      Left Ear: External ear normal.   Eyes:      Conjunctiva/sclera: Conjunctivae normal.      Pupils: Pupils are equal, round, and reactive to light.   Cardiovascular:      Rate and Rhythm: Normal rate and regular rhythm.      Heart sounds: No murmur heard.    No friction rub. No gallop.   Pulmonary:      Effort: Pulmonary effort is normal.      Breath sounds: Normal breath sounds. No wheezing or rhonchi.   Musculoskeletal:      Cervical back: No tenderness or bony tenderness. No pain with movement. Normal range of motion.   Skin:     General: Skin is warm and dry.   Neurological:      Mental Status: He is alert and oriented to person, place, and time.      Cranial Nerves: No cranial nerve deficit.   Psychiatric:         Mood and Affect: Mood and affect normal.         Behavior: Behavior normal.         Thought Content: Thought content normal.         Judgment: Judgment normal.          Result Review        CT Head With & Without Contrast    Result Date: 7/7/2022    1. Mild left " ethmoid and left frontal sinus disease. 2. No acute intracranial finding.  3. Fluid density area in the right temporal fossa similar to prior CT from 2008 and could reflect area of encephalomalacia.  There is mild dilation of the right temporal horn suggesting volume loss similar to previous exam.     OMI HUMPHREYS MD       Electronically Signed and Approved By: OMI HUMPHREYS MD on 7/07/2022 at 16:59                The above data has been reviewed by RAFAL Trejo 11/02/2022 06:59 EDT.          Patient Care Team:  Vicki Crews APRN as PCP - General (Nurse Practitioner)  Henny Greenberg MA as Medical Assistant           ASSESSMENT & PLAN    Diagnoses and all orders for this visit:    1. Neck pain (Primary)  -     meloxicam (Mobic) 7.5 MG tablet; Take 1 tablet by mouth Daily.  Dispense: 30 tablet; Refill: 1  -     triamcinolone acetonide (KENALOG-40) injection 60 mg    2. Left cervical radiculopathy  -     meloxicam (Mobic) 7.5 MG tablet; Take 1 tablet by mouth Daily.  Dispense: 30 tablet; Refill: 1  -     triamcinolone acetonide (KENALOG-40) injection 60 mg    Discussed with patient that I do not recommend significant manipulation of his neck by chiropractor, discussed with patient that x-ray and probable MRI would be the next step in evaluating this problem, he declines to do so at present, discussed that if he has any worsening symptoms or if they do not resolve I do recommend that he follow-up for further evaluation.  Patient verbalized understanding    Tobacco Use: Medium Risk   • Smoking Tobacco Use: Former   • Smokeless Tobacco Use: Former   • Passive Exposure: Not on file       Follow Up     Return if symptoms worsen or fail to improve.        Patient was given instructions and counseling regarding his condition or for health maintenance advice. Please see specific information pulled into the AVS if appropriate.   I have reviewed information obtained and documented by others and I have  confirmed the accuracy of this documented note.    Vicki Crews, APRN

## 2023-01-16 DIAGNOSIS — E78.5 HYPERLIPIDEMIA, UNSPECIFIED HYPERLIPIDEMIA TYPE: ICD-10-CM

## 2023-01-16 RX ORDER — ROSUVASTATIN CALCIUM 5 MG/1
5 TABLET, COATED ORAL NIGHTLY
Qty: 90 TABLET | Refills: 1 | Status: SHIPPED | OUTPATIENT
Start: 2023-01-16

## 2023-01-25 ENCOUNTER — TELEPHONE (OUTPATIENT)
Dept: FAMILY MEDICINE CLINIC | Facility: CLINIC | Age: 43
End: 2023-01-25
Payer: COMMERCIAL

## 2023-01-30 ENCOUNTER — LAB (OUTPATIENT)
Dept: LAB | Facility: HOSPITAL | Age: 43
End: 2023-01-30
Payer: COMMERCIAL

## 2023-01-30 DIAGNOSIS — E78.5 HYPERLIPIDEMIA, UNSPECIFIED HYPERLIPIDEMIA TYPE: ICD-10-CM

## 2023-01-30 LAB
CHOLEST SERPL-MCNC: 136 MG/DL (ref 0–200)
HDLC SERPL-MCNC: 38 MG/DL (ref 40–60)
LDLC SERPL CALC-MCNC: 72 MG/DL (ref 0–100)
LDLC/HDLC SERPL: 1.78 {RATIO}
TRIGL SERPL-MCNC: 151 MG/DL (ref 0–150)
VLDLC SERPL-MCNC: 26 MG/DL (ref 5–40)

## 2023-01-30 PROCEDURE — 36415 COLL VENOUS BLD VENIPUNCTURE: CPT

## 2023-01-30 PROCEDURE — 80061 LIPID PANEL: CPT

## 2023-03-31 DIAGNOSIS — K21.9 GASTROESOPHAGEAL REFLUX DISEASE WITHOUT ESOPHAGITIS: ICD-10-CM

## 2023-04-03 RX ORDER — LANSOPRAZOLE 30 MG/1
CAPSULE, DELAYED RELEASE ORAL
Qty: 90 CAPSULE | Refills: 1 | Status: SHIPPED | OUTPATIENT
Start: 2023-04-03

## 2023-04-24 ENCOUNTER — TELEPHONE (OUTPATIENT)
Dept: FAMILY MEDICINE CLINIC | Facility: CLINIC | Age: 43
End: 2023-04-24
Payer: COMMERCIAL

## 2023-04-24 DIAGNOSIS — Z13.220 LIPID SCREENING: Primary | ICD-10-CM

## 2023-04-24 DIAGNOSIS — Z13.29 THYROID DISORDER SCREENING: ICD-10-CM

## 2023-04-24 DIAGNOSIS — Z00.00 ANNUAL PHYSICAL EXAM: ICD-10-CM

## 2023-04-24 DIAGNOSIS — R79.89 LOW TESTOSTERONE: ICD-10-CM

## 2023-04-24 NOTE — TELEPHONE ENCOUNTER
Caller: Asad Sutton    Relationship: Self    Best call back number: [phone]    What orders are you requesting (i.e. lab or imaging): LABS AND TESTOSTERONE CHECK    In what timeframe would the patient need to come in: ASAP    Where will you receive your lab/imaging services: [facility]

## 2023-04-24 NOTE — TELEPHONE ENCOUNTER
Called pt to advise PCP likes to order all labs during OV. Pt stated he used to do testosterone injections and would like to check levels prior to appt.     Please advise

## 2023-04-24 NOTE — TELEPHONE ENCOUNTER
We can go ahead and place the order for labs, just make him aware that if there is anything additional that he decides he wants checked or I feel needs to be checked during our discussion&evaluation he may have to have an additional lab draw.  Orders placed for standard screening physical labs and a testosterone level.  He needs to have this done prior to 10 AM, fasting

## 2023-05-01 ENCOUNTER — LAB (OUTPATIENT)
Dept: LAB | Facility: HOSPITAL | Age: 43
End: 2023-05-01
Payer: COMMERCIAL

## 2023-05-01 DIAGNOSIS — Z00.00 ANNUAL PHYSICAL EXAM: ICD-10-CM

## 2023-05-01 DIAGNOSIS — Z13.29 THYROID DISORDER SCREENING: ICD-10-CM

## 2023-05-01 DIAGNOSIS — Z13.220 LIPID SCREENING: ICD-10-CM

## 2023-05-01 DIAGNOSIS — R79.89 LOW TESTOSTERONE: ICD-10-CM

## 2023-05-01 LAB
ALBUMIN SERPL-MCNC: 4.6 G/DL (ref 3.5–5.2)
ALBUMIN/GLOB SERPL: 1.9 G/DL
ALP SERPL-CCNC: 70 U/L (ref 39–117)
ALT SERPL W P-5'-P-CCNC: 38 U/L (ref 1–41)
ANION GAP SERPL CALCULATED.3IONS-SCNC: 8 MMOL/L (ref 5–15)
AST SERPL-CCNC: 37 U/L (ref 1–40)
BASOPHILS # BLD AUTO: 0.09 10*3/MM3 (ref 0–0.2)
BASOPHILS NFR BLD AUTO: 1.3 % (ref 0–1.5)
BILIRUB SERPL-MCNC: 0.5 MG/DL (ref 0–1.2)
BUN SERPL-MCNC: 17 MG/DL (ref 6–20)
BUN/CREAT SERPL: 17.2 (ref 7–25)
CALCIUM SPEC-SCNC: 9.5 MG/DL (ref 8.6–10.5)
CHLORIDE SERPL-SCNC: 106 MMOL/L (ref 98–107)
CHOLEST SERPL-MCNC: 116 MG/DL (ref 0–200)
CO2 SERPL-SCNC: 26 MMOL/L (ref 22–29)
CREAT SERPL-MCNC: 0.99 MG/DL (ref 0.76–1.27)
DEPRECATED RDW RBC AUTO: 41.3 FL (ref 37–54)
EGFRCR SERPLBLD CKD-EPI 2021: 97.5 ML/MIN/1.73
EOSINOPHIL # BLD AUTO: 0.71 10*3/MM3 (ref 0–0.4)
EOSINOPHIL NFR BLD AUTO: 10.6 % (ref 0.3–6.2)
ERYTHROCYTE [DISTWIDTH] IN BLOOD BY AUTOMATED COUNT: 12.5 % (ref 12.3–15.4)
GLOBULIN UR ELPH-MCNC: 2.4 GM/DL
GLUCOSE SERPL-MCNC: 97 MG/DL (ref 65–99)
HCT VFR BLD AUTO: 43.2 % (ref 37.5–51)
HDLC SERPL-MCNC: 41 MG/DL (ref 40–60)
HGB BLD-MCNC: 14.6 G/DL (ref 13–17.7)
IMM GRANULOCYTES # BLD AUTO: 0.02 10*3/MM3 (ref 0–0.05)
IMM GRANULOCYTES NFR BLD AUTO: 0.3 % (ref 0–0.5)
LDLC SERPL CALC-MCNC: 59 MG/DL (ref 0–100)
LDLC/HDLC SERPL: 1.43 {RATIO}
LYMPHOCYTES # BLD AUTO: 2.05 10*3/MM3 (ref 0.7–3.1)
LYMPHOCYTES NFR BLD AUTO: 30.6 % (ref 19.6–45.3)
MCH RBC QN AUTO: 30.2 PG (ref 26.6–33)
MCHC RBC AUTO-ENTMCNC: 33.8 G/DL (ref 31.5–35.7)
MCV RBC AUTO: 89.3 FL (ref 79–97)
MONOCYTES # BLD AUTO: 0.62 10*3/MM3 (ref 0.1–0.9)
MONOCYTES NFR BLD AUTO: 9.3 % (ref 5–12)
NEUTROPHILS NFR BLD AUTO: 3.2 10*3/MM3 (ref 1.7–7)
NEUTROPHILS NFR BLD AUTO: 47.9 % (ref 42.7–76)
NRBC BLD AUTO-RTO: 0 /100 WBC (ref 0–0.2)
PLATELET # BLD AUTO: 324 10*3/MM3 (ref 140–450)
PMV BLD AUTO: 11 FL (ref 6–12)
POTASSIUM SERPL-SCNC: 4.6 MMOL/L (ref 3.5–5.2)
PROT SERPL-MCNC: 7 G/DL (ref 6–8.5)
RBC # BLD AUTO: 4.84 10*6/MM3 (ref 4.14–5.8)
SODIUM SERPL-SCNC: 140 MMOL/L (ref 136–145)
TESTOST SERPL-MCNC: 472 NG/DL (ref 249–836)
TRIGL SERPL-MCNC: 81 MG/DL (ref 0–150)
TSH SERPL DL<=0.05 MIU/L-ACNC: 1.1 UIU/ML (ref 0.27–4.2)
VLDLC SERPL-MCNC: 16 MG/DL (ref 5–40)
WBC NRBC COR # BLD: 6.69 10*3/MM3 (ref 3.4–10.8)

## 2023-05-01 PROCEDURE — 80050 GENERAL HEALTH PANEL: CPT

## 2023-05-01 PROCEDURE — 80061 LIPID PANEL: CPT

## 2023-05-01 PROCEDURE — 36415 COLL VENOUS BLD VENIPUNCTURE: CPT

## 2023-05-01 PROCEDURE — 84403 ASSAY OF TOTAL TESTOSTERONE: CPT

## 2023-05-10 ENCOUNTER — OFFICE VISIT (OUTPATIENT)
Dept: FAMILY MEDICINE CLINIC | Facility: CLINIC | Age: 43
End: 2023-05-10
Payer: COMMERCIAL

## 2023-05-10 VITALS
HEIGHT: 78 IN | SYSTOLIC BLOOD PRESSURE: 128 MMHG | DIASTOLIC BLOOD PRESSURE: 89 MMHG | HEART RATE: 65 BPM | OXYGEN SATURATION: 99 % | BODY MASS INDEX: 25.06 KG/M2 | WEIGHT: 216.6 LBS

## 2023-05-10 DIAGNOSIS — Z00.00 ANNUAL PHYSICAL EXAM: Primary | ICD-10-CM

## 2023-05-10 DIAGNOSIS — K21.9 GASTROESOPHAGEAL REFLUX DISEASE, UNSPECIFIED WHETHER ESOPHAGITIS PRESENT: ICD-10-CM

## 2023-05-10 DIAGNOSIS — E78.5 HYPERLIPIDEMIA, UNSPECIFIED HYPERLIPIDEMIA TYPE: ICD-10-CM

## 2023-05-10 DIAGNOSIS — Z79.899 MEDICATION MANAGEMENT: ICD-10-CM

## 2023-05-10 DIAGNOSIS — F41.9 ANXIETY: ICD-10-CM

## 2023-05-10 LAB
AMPHET+METHAMPHET UR QL: NEGATIVE
AMPHETAMINE INTERNAL CONTROL: ABNORMAL
AMPHETAMINES UR QL: NEGATIVE
BARBITURATE INTERNAL CONTROL: ABNORMAL
BARBITURATES UR QL SCN: NEGATIVE
BENZODIAZ UR QL SCN: NEGATIVE
BENZODIAZEPINE INTERNAL CONTROL: ABNORMAL
BUPRENORPHINE INTERNAL CONTROL: ABNORMAL
BUPRENORPHINE SERPL-MCNC: NEGATIVE NG/ML
CANNABINOIDS SERPL QL: POSITIVE
COCAINE INTERNAL CONTROL: ABNORMAL
COCAINE UR QL: NEGATIVE
EXPIRATION DATE: ABNORMAL
Lab: ABNORMAL
MDMA (ECSTASY) INTERNAL CONTROL: ABNORMAL
MDMA UR QL SCN: NEGATIVE
METHADONE INTERNAL CONTROL: ABNORMAL
METHADONE UR QL SCN: NEGATIVE
METHAMPHETAMINE INTERNAL CONTROL: ABNORMAL
OPIATES INTERNAL CONTROL: ABNORMAL
OPIATES UR QL: NEGATIVE
OXYCODONE INTERNAL CONTROL: ABNORMAL
OXYCODONE UR QL SCN: NEGATIVE
PCP UR QL SCN: NEGATIVE
PHENCYCLIDINE INTERNAL CONTROL: ABNORMAL
THC INTERNAL CONTROL: ABNORMAL

## 2023-05-10 RX ORDER — ROSUVASTATIN CALCIUM 5 MG/1
5 TABLET, COATED ORAL NIGHTLY
Qty: 90 TABLET | Refills: 1 | Status: SHIPPED | OUTPATIENT
Start: 2023-05-10

## 2023-05-10 RX ORDER — ESCITALOPRAM OXALATE 10 MG/1
10 TABLET ORAL DAILY
Qty: 30 TABLET | Refills: 2 | Status: SHIPPED | OUTPATIENT
Start: 2023-05-10

## 2023-05-10 RX ORDER — LORAZEPAM 0.5 MG/1
0.5 TABLET ORAL DAILY PRN
Qty: 20 TABLET | Refills: 0 | Status: SHIPPED | OUTPATIENT
Start: 2023-05-10

## 2023-05-10 NOTE — PROGRESS NOTES
Chief Complaint  Annual exam, anxiety  SUBJECTIVE  Asad Sutton presents to [facility] to discuss medication changes, annual exam, med refills    Patient complaining of poorly controlled anxiety, was previously on Wellbutrin, had stopped that due to not much help, has as needed Ativan that he uses rarely, states that his anxiety has gotten to the point where he feels like he needs something daily.  States that he gets very irritable and overwhelmed feeling, states that yesterday the anxiety became so bad and overwhelming that he actually cried.  Denies any depression    History of Present Illness  Past Medical History:   Diagnosis Date   • GERD (gastroesophageal reflux disease)    • Hyperlipidemia    • Subcutaneous mass of abdominal wall       Family History   Problem Relation Age of Onset   • Hypertension Father    • Hypertension Brother    • Diabetes Maternal Grandfather    • Diabetes Paternal Grandfather    • Cancer Neg Hx       Past Surgical History:   Procedure Laterality Date   • BUNIONECTOMY      left, 2020   • EXCISION LESION Left 7/29/2022    Procedure: Excision of subcutaneous mass from abdominal wall;  Surgeon: Anirudh Guillen MD;  [place] [facility] [place] [facility] [place]  Service: General;  Laterality: Left;   • HAND SURGERY     • VEIN SURGERY          Current Outpatient Medications:   •  lansoprazole (PREVACID) 30 MG capsule, TAKE 1 CAPSULE BY MOUTH EVERY DAY, Disp: 90 capsule, Rfl: 1  •  Omega-3 Fatty Acids (fish oil) 1000 MG capsule capsule, Take 1 capsule by mouth Daily With Breakfast., Disp: , Rfl:   •  rosuvastatin (CRESTOR) 5 MG tablet, Take 1 tablet by mouth Every Night., Disp: 90 tablet, Rfl: 1  •  escitalopram (Lexapro) 10 MG tablet, Take 1 tablet by mouth Daily., Disp: 30 tablet, Rfl: 2  •  LORazepam (Ativan) 0.5 MG tablet, Take 1 tablet by mouth Daily As Needed for Anxiety., Disp: 20 tablet, Rfl: 0    OBJECTIVE  Vital Signs:   /89 (BP Location: Right arm, Patient  "Position: Sitting, Cuff Size: Adult)   Pulse 65   Ht 198.1 cm (78\")   Wt 98.2 kg (216 lb 9.6 oz)   SpO2 99%   BMI 25.03 kg/m²    Estimated body mass index is 25.03 kg/m² as calculated from the following:    Height as of this encounter: 198.1 cm (78\").    Weight as of this encounter: 98.2 kg (216 lb 9.6 oz).     Wt Readings from Last 3 Encounters:   05/10/23 98.2 kg (216 lb 9.6 oz)   11/18/22 99.5 kg (219 lb 6.4 oz)   11/02/22 102 kg (225 lb)     BP Readings from Last 3 Encounters:   05/10/23 128/89   11/18/22 125/78   11/02/22 122/81       Physical Exam  Vitals reviewed.   Constitutional:       General: He is not in acute distress.     Appearance: Normal appearance. He is well-developed. He is not diaphoretic.   HENT:      Head: Normocephalic and atraumatic. Hair is normal.      Right Ear: Hearing, tympanic membrane, ear canal and external ear normal.      Left Ear: Hearing, tympanic membrane, ear canal and external ear normal.      Nose: Nose normal. No nasal deformity.      Mouth/Throat:      Mouth: Mucous membranes are moist. No oral lesions.      Pharynx: Uvula midline. No uvula swelling.   Eyes:      General: Lids are normal. No scleral icterus.        Right eye: No discharge.         Left eye: No discharge.      Extraocular Movements: Extraocular movements intact.      Right eye: Normal extraocular motion and no nystagmus.      Left eye: Normal extraocular motion and no nystagmus.      Conjunctiva/sclera: Conjunctivae normal.      Pupils: Pupils are equal, round, and reactive to light.   Neck:      Thyroid: No thyromegaly.      Vascular: No JVD.   Cardiovascular:      Rate and Rhythm: Normal rate and regular rhythm.      Pulses: Normal pulses.      Heart sounds: Normal heart sounds. No murmur heard.    No friction rub. No gallop.   Pulmonary:      Effort: Pulmonary effort is normal. No respiratory distress.      Breath sounds: Normal breath sounds. No wheezing, rhonchi or rales.   Chest:      Chest wall: " No tenderness.   Abdominal:      General: Bowel sounds are normal. There is no distension.      Palpations: Abdomen is soft. There is no mass.      Tenderness: There is no abdominal tenderness. There is no guarding.      Hernia: No hernia is present.   Musculoskeletal:         General: No tenderness or deformity. Normal range of motion.      Cervical back: Normal range of motion and neck supple.   Lymphadenopathy:      Cervical: No cervical adenopathy.   Skin:     General: Skin is warm and dry.      Findings: No rash.   Neurological:      Mental Status: He is alert and oriented to person, place, and time.      Cranial Nerves: No cranial nerve deficit.      Coordination: Coordination normal.      Deep Tendon Reflexes: Reflexes are normal and symmetric. Reflexes normal.   Psychiatric:         Mood and Affect: Mood and affect normal.         Behavior: Behavior normal.         Thought Content: Thought content normal.         Judgment: Judgment normal.          Result Review    CMP        6/1/2022    15:33 5/1/2023    07:36   CMP   Glucose 91   97     BUN 19   17     Creatinine 1.05   0.99     EGFR 91.5   97.5     Sodium 139   140     Potassium 4.3   4.6     Chloride 103   106     Calcium 9.9   9.5     Total Protein 7.1   7.0     Albumin 4.80   4.6     Globulin 2.3   2.4     Total Bilirubin 0.4   0.5     Alkaline Phosphatase 82   70     AST (SGOT) 14   37     ALT (SGPT) 15   38     Albumin/Globulin Ratio 2.1   1.9     BUN/Creatinine Ratio 18.1   17.2     Anion Gap 11.8   8.0       CBC        6/1/2022    15:33 5/1/2023    07:36   CBC   WBC 8.59   6.69     RBC 4.55   4.84     Hemoglobin 13.7   14.6     Hematocrit 39.7   43.2     MCV 87.3   89.3     MCH 30.1   30.2     MCHC 34.5   33.8     RDW 13.7   12.5     Platelets 318   324       Lipid Panel        7/19/2022    07:48 1/30/2023    07:14 5/1/2023    07:36   Lipid Panel   Total Cholesterol 208   136   116     Triglycerides 134   151   81     HDL Cholesterol 41   38   41      VLDL Cholesterol 24   26   16     LDL Cholesterol  143   72   59     LDL/HDL Ratio 3.42   1.78   1.43       TSH        5/1/2023    07:36   TSH   TSH 1.100             No Images in the past 120 days found..     The above data has been reviewed by RAFAL Trejo 05/10/2023 07:21 EDT.          Patient Care Team:  Vicki Crews APRN as PCP - General (Nurse Practitioner)  Henny Greenberg MA as Medical Assistant    BMI is >= 25 and <30. (Overweight) The following options were offered after discussion;: exercise counseling/recommendations and nutrition counseling/recommendations       ASSESSMENT & PLAN    Diagnoses and all orders for this visit:    1. Annual physical exam (Primary)  Comments:  Labs and prior to physical per patient request, discussed lab results during visit today    2. Anxiety  Assessment & Plan:  Patient having significantly worsening anxiety, after discussion we decided to trial Lexapro, side effects administration medication discussed, we will also send a refill of the as needed Ativan, controlled substance contract and UDS updated, Niko appropriate, Ativan last filled approximately 1 year ago.  We will follow-up in 4 to 6 weeks for reevaluation    Orders:  -     escitalopram (Lexapro) 10 MG tablet; Take 1 tablet by mouth Daily.  Dispense: 30 tablet; Refill: 2  -     LORazepam (Ativan) 0.5 MG tablet; Take 1 tablet by mouth Daily As Needed for Anxiety.  Dispense: 20 tablet; Refill: 0    3. Hyperlipidemia, unspecified hyperlipidemia type  Assessment & Plan:  Cholesterol is much improved on Crestor, continue current medication, continue diet and exercise    Orders:  -     rosuvastatin (CRESTOR) 5 MG tablet; Take 1 tablet by mouth Every Night.  Dispense: 90 tablet; Refill: 1    4. Medication management  -     POC Urine Drug Screen [facility]    5. Gastroesophageal reflux disease, unspecified whether esophagitis present  Assessment & Plan:  Well-controlled with Prevacid, continue  current dose         Tobacco Use: Medium Risk   • Smoking Tobacco Use: Former   • Smokeless Tobacco Use: Former   • Passive Exposure: Not on file     The patient is advised to follow a healthy diet and exercise, continue current medications, continue current healthy lifestyle patterns and return for routine annual checkups.    Follow Up     Return in 6 weeks (on 6/21/2023), or if symptoms worsen or fail to improve.        Patient was given instructions and counseling regarding his condition or for health maintenance advice. Please see specific information pulled into the AVS if appropriate.   I have reviewed information obtained and documented by others and I have confirmed the accuracy of this documented note.    RAFAL Trejo        Answers for HPI/ROS submitted by the patient on 5/10/2023  What is the primary reason for your visit?: Other  Please describe your symptoms.: Want get my medicines back  Have you had these symptoms before?: Yes  How long have you been having these symptoms?: Greater than 2 weeks

## 2023-05-10 NOTE — ASSESSMENT & PLAN NOTE
Patient having significantly worsening anxiety, after discussion we decided to trial Lexapro, side effects administration medication discussed, we will also send a refill of the as needed Ativan, controlled substance contract and UDS updated, Niko appropriate, Ativan last filled approximately 1 year ago.  We will follow-up in 4 to 6 weeks for reevaluation

## 2023-05-15 ENCOUNTER — TELEPHONE (OUTPATIENT)
Dept: FAMILY MEDICINE CLINIC | Facility: CLINIC | Age: 43
End: 2023-05-15

## 2023-05-15 NOTE — TELEPHONE ENCOUNTER
PATIENT CALLED REQUESTING TO SPEAK WITH MA REGARDING MED QUESTIONS AND DIRECTIONS .. escitalopram (Lexapro) 10 MG tablet

## 2023-05-16 NOTE — TELEPHONE ENCOUNTER
CALLED SPOKE WITH PT ADVISED MED'S CAN TAKE A FEW WEEKS FOR HIS BODY TO GET USE TO. PT OK' D ADVISED PT IF HIS SYMPTOMS OF FATIGUE DON'T SUBSIDE IN A FEW WEEKS CALL OFFICE BACK

## 2023-06-15 ENCOUNTER — HOSPITAL ENCOUNTER (OUTPATIENT)
Dept: GENERAL RADIOLOGY | Facility: HOSPITAL | Age: 43
Discharge: HOME | End: 2023-06-15
Payer: COMMERCIAL

## 2023-06-15 ENCOUNTER — OFFICE VISIT (OUTPATIENT)
Dept: FAMILY MEDICINE CLINIC | Facility: CLINIC | Age: 43
End: 2023-06-15
Payer: COMMERCIAL

## 2023-06-15 VITALS
HEIGHT: 78 IN | WEIGHT: 224 LBS | DIASTOLIC BLOOD PRESSURE: 76 MMHG | BODY MASS INDEX: 25.92 KG/M2 | SYSTOLIC BLOOD PRESSURE: 113 MMHG | OXYGEN SATURATION: 99 % | HEART RATE: 57 BPM

## 2023-06-15 DIAGNOSIS — M54.2 NECK PAIN: Primary | ICD-10-CM

## 2023-06-15 DIAGNOSIS — F41.9 ANXIETY: ICD-10-CM

## 2023-06-15 DIAGNOSIS — M50.30 DEGENERATIVE DISC DISEASE, CERVICAL: Primary | ICD-10-CM

## 2023-06-15 DIAGNOSIS — M54.12 RADICULOPATHY, CERVICAL: ICD-10-CM

## 2023-06-15 DIAGNOSIS — M54.2 NECK PAIN: ICD-10-CM

## 2023-06-15 PROCEDURE — 72040 X-RAY EXAM NECK SPINE 2-3 VW: CPT

## 2023-06-15 RX ORDER — ESCITALOPRAM OXALATE 10 MG/1
10 TABLET ORAL DAILY
Qty: 90 TABLET | Refills: 0 | Status: SHIPPED | OUTPATIENT
Start: 2023-06-15

## 2023-06-15 RX ORDER — TRIAMCINOLONE ACETONIDE 40 MG/ML
60 INJECTION, SUSPENSION INTRA-ARTICULAR; INTRAMUSCULAR ONCE
Status: COMPLETED | OUTPATIENT
Start: 2023-06-15 | End: 2023-06-15

## 2023-06-15 RX ADMIN — TRIAMCINOLONE ACETONIDE 60 MG: 40 INJECTION, SUSPENSION INTRA-ARTICULAR; INTRAMUSCULAR at 07:48

## 2023-06-15 NOTE — PROGRESS NOTES
Chief Complaint  Follow-up anxiety, neck pain    SUBJECTIVE  Asad Sutton presents to [facility] for a 4-6 week follow up anxiety , we started Lexapro at previous office visit, patient states seems to be working very well.  He is very happy with the results.      Pt c/o neck/shoulder pain and numbness at times into left arm, states this has been an on-going issue for several years. States sees the chiropractor, and occasionally has gotten a Kenalog injection IM that will help calm the symptoms down for a while, has never had this formally evaluated..  Reports that if he looks down a lot at work, his left arm and hand will start to go numb, and has pain across his neck and shoulder.  States that he frequently tries shaking his hand because he feels like it is asleep.       History of Present Illness  Past Medical History:   Diagnosis Date    GERD (gastroesophageal reflux disease)     Hyperlipidemia     Subcutaneous mass of abdominal wall       Family History   Problem Relation Age of Onset    Hypertension Father     Hypertension Brother     Diabetes Maternal Grandfather     Diabetes Paternal Grandfather     Cancer Neg Hx       Past Surgical History:   Procedure Laterality Date    BUNIONECTOMY      left, 2020    EXCISION LESION Left 7/29/2022    Procedure: Excision of subcutaneous mass from abdominal wall;  Surgeon: Anirudh Guillen MD;  [place] [facility] [place] [facility] [place];  Service: General;  Laterality: Left;    HAND SURGERY      VEIN SURGERY          Current Outpatient Medications:     escitalopram (Lexapro) 10 MG tablet, Take 1 tablet by mouth Daily., Disp: 90 tablet, Rfl: 0    lansoprazole (PREVACID) 30 MG capsule, TAKE 1 CAPSULE BY MOUTH EVERY DAY, Disp: 90 capsule, Rfl: 1    LORazepam (Ativan) 0.5 MG tablet, Take 1 tablet by mouth Daily As Needed for Anxiety., Disp: 20 tablet, Rfl: 0    Omega-3 Fatty Acids (fish oil) 1000 MG capsule capsule, Take 1 capsule by mouth Daily With  "Breakfast., Disp: , Rfl:     rosuvastatin (CRESTOR) 5 MG tablet, Take 1 tablet by mouth Every Night., Disp: 90 tablet, Rfl: 1  No current facility-administered medications for this visit.    OBJECTIVE  Vital Signs:   /76 (BP Location: Right arm, Patient Position: Sitting, Cuff Size: Adult)   Pulse 57   Ht 198.1 cm (78\")   Wt 102 kg (224 lb)   SpO2 99%   BMI 25.89 kg/m²    Estimated body mass index is 25.89 kg/m² as calculated from the following:    Height as of this encounter: 198.1 cm (78\").    Weight as of this encounter: 102 kg (224 lb).     Wt Readings from Last 3 Encounters:   06/15/23 102 kg (224 lb)   05/10/23 98.2 kg (216 lb 9.6 oz)   11/18/22 99.5 kg (219 lb 6.4 oz)     BP Readings from Last 3 Encounters:   06/15/23 113/76   05/10/23 128/89   11/18/22 125/78       Physical Exam  Vitals reviewed.   Constitutional:       Appearance: Normal appearance. He is well-developed.   HENT:      Head: Normocephalic and atraumatic.      Right Ear: External ear normal.      Left Ear: External ear normal.   Eyes:      Conjunctiva/sclera: Conjunctivae normal.      Pupils: Pupils are equal, round, and reactive to light.   Cardiovascular:      Rate and Rhythm: Normal rate and regular rhythm.      Heart sounds: No murmur heard.    No friction rub. No gallop.   Pulmonary:      Effort: Pulmonary effort is normal.      Breath sounds: Normal breath sounds. No wheezing or rhonchi.   Musculoskeletal:      Cervical back: Tenderness present. No bony tenderness. Pain with movement present.   Skin:     General: Skin is warm and dry.   Neurological:      Mental Status: He is alert and oriented to person, place, and time.      Cranial Nerves: No cranial nerve deficit.   Psychiatric:         Mood and Affect: Mood and affect normal.         Behavior: Behavior normal.         Thought Content: Thought content normal.         Judgment: Judgment normal.        Result Review    [facility]          5/1/2023    07:36   CMP   Glucose 97    BUN " 17    Creatinine 0.99    EGFR 97.5    Sodium 140    Potassium 4.6    Chloride 106    Calcium 9.5    Total Protein 7.0    Albumin 4.6    Globulin 2.4    Total Bilirubin 0.5    Alkaline Phosphatase 70    AST (SGOT) 37    ALT (SGPT) 38    Albumin/Globulin Ratio 1.9    BUN/Creatinine Ratio 17.2    Anion Gap 8.0      CBC          5/1/2023    07:36   CBC   WBC 6.69    RBC 4.84    Hemoglobin 14.6    Hematocrit 43.2    MCV 89.3    MCH 30.2    MCHC 33.8    RDW 12.5    Platelets 324      Lipid Panel          7/19/2022    07:48 1/30/2023    07:14 5/1/2023    07:36   Lipid Panel   Total Cholesterol 208  136  116    Triglycerides 134  151  81    HDL Cholesterol 41  38  41    VLDL Cholesterol 24  26  16    LDL Cholesterol  143  72  59    LDL/HDL Ratio 3.42  1.78  1.43      TSH          5/1/2023    07:36   TSH   TSH 1.100        No Images in the past 120 days found..     The above data has been reviewed by RAFAL Trejo 06/15/2023 07:15 EDT.          Patient Care Team:  Vicki Crews APRN as PCP - General (Nurse Practitioner)  Henny Greenberg MA as Medical Assistant            ASSESSMENT & PLAN    Diagnoses and all orders for this visit:    1. Neck pain (Primary)  Assessment & Plan:  Discussed we will start with x-ray, expect to order MRI    Orders:  -     XR Spine Cervical 2 or 3 View; Future  -     triamcinolone acetonide (KENALOG-40) injection 60 mg    2. Anxiety  Assessment & Plan:  Much improved with Lexapro, we will continue the current dose    Orders:  -     escitalopram (Lexapro) 10 MG tablet; Take 1 tablet by mouth Daily.  Dispense: 90 tablet; Refill: 0    3. Radiculopathy, cervical  -     XR Spine Cervical 2 or 3 View; Future         Tobacco Use: Medium Risk    Smoking Tobacco Use: Former    Smokeless Tobacco Use: Former    Passive Exposure: Not on file       Follow Up     Return in 3 months (on 9/15/2023), or if symptoms worsen or fail to improve.        Patient was given instructions and counseling  regarding his condition or for health maintenance advice. Please see specific information pulled into the AVS if appropriate.   I have reviewed information obtained and documented by others and I have confirmed the accuracy of this documented note.    RAFAL Trejo

## 2023-08-03 ENCOUNTER — PATIENT ROUNDING (BHMG ONLY) (OUTPATIENT)
Dept: NEUROLOGY | Facility: CLINIC | Age: 43
End: 2023-08-03
Payer: COMMERCIAL

## 2023-08-03 ENCOUNTER — OFFICE VISIT (OUTPATIENT)
Dept: NEUROSURGERY | Facility: CLINIC | Age: 43
End: 2023-08-03
Payer: COMMERCIAL

## 2023-08-03 VITALS
BODY MASS INDEX: 26.96 KG/M2 | HEART RATE: 56 BPM | SYSTOLIC BLOOD PRESSURE: 114 MMHG | HEIGHT: 78 IN | DIASTOLIC BLOOD PRESSURE: 75 MMHG | WEIGHT: 233 LBS

## 2023-08-03 DIAGNOSIS — M54.2 CERVICALGIA: ICD-10-CM

## 2023-08-03 DIAGNOSIS — M47.812 FACET ARTHROPATHY, CERVICAL: ICD-10-CM

## 2023-08-03 DIAGNOSIS — R20.0 NUMBNESS AND TINGLING IN LEFT ARM: ICD-10-CM

## 2023-08-03 DIAGNOSIS — M50.322 DEGENERATION OF C5-C6 INTERVERTEBRAL DISC: Primary | ICD-10-CM

## 2023-08-03 DIAGNOSIS — R20.2 NUMBNESS AND TINGLING IN LEFT ARM: ICD-10-CM

## 2023-08-03 DIAGNOSIS — M48.02 FORAMINAL STENOSIS OF CERVICAL REGION: ICD-10-CM

## 2023-08-03 DIAGNOSIS — M54.12 LEFT CERVICAL RADICULOPATHY: ICD-10-CM

## 2023-08-03 RX ORDER — TESTOSTERONE CYPIONATE 200 MG/ML
200 INJECTION, SOLUTION INTRAMUSCULAR
COMMUNITY
Start: 2023-07-30

## 2023-08-26 DIAGNOSIS — K21.9 GASTROESOPHAGEAL REFLUX DISEASE WITHOUT ESOPHAGITIS: ICD-10-CM

## 2023-08-28 RX ORDER — LANSOPRAZOLE 30 MG/1
CAPSULE, DELAYED RELEASE ORAL
Qty: 90 CAPSULE | Refills: 0 | Status: SHIPPED | OUTPATIENT
Start: 2023-08-28

## 2023-09-14 ENCOUNTER — TELEPHONE (OUTPATIENT)
Dept: ORTHOPEDIC SURGERY | Facility: CLINIC | Age: 43
End: 2023-09-14
Payer: COMMERCIAL

## 2023-09-14 NOTE — TELEPHONE ENCOUNTER
Caller: Asad Sutton    Relationship: Self    Best call back number: [phone]    What form or medical record are you requesting: WORK RELEASE    Who is requesting this form or medical record from you: [employer]    How would you like to receive the form or medical records (pick-up, mail, fax): FAX  If fax, what is the fax number: [phone]     Timeframe paperwork needed: ASAP    Additional notes: NEEDS WORK RELEASE WITHOUT RESTRICTIONS - PT LAST SEEN 7/3/23

## 2023-12-23 DIAGNOSIS — K21.9 GASTROESOPHAGEAL REFLUX DISEASE WITHOUT ESOPHAGITIS: ICD-10-CM

## 2023-12-26 RX ORDER — LANSOPRAZOLE 30 MG/1
CAPSULE, DELAYED RELEASE ORAL
Qty: 90 CAPSULE | Refills: 0 | Status: SHIPPED | OUTPATIENT
Start: 2023-12-26

## 2024-11-18 DIAGNOSIS — K21.9 GASTROESOPHAGEAL REFLUX DISEASE WITHOUT ESOPHAGITIS: ICD-10-CM

## 2024-11-18 RX ORDER — LANSOPRAZOLE 30 MG/1
CAPSULE, DELAYED RELEASE ORAL
Qty: 90 CAPSULE | Refills: 0 | OUTPATIENT
Start: 2024-11-18

## 2025-02-11 ENCOUNTER — TRANSCRIBE ORDERS (OUTPATIENT)
Dept: ADMINISTRATIVE | Facility: HOSPITAL | Age: 45
End: 2025-02-11
Payer: COMMERCIAL

## 2025-02-11 DIAGNOSIS — I83.813 VARICOSE VEINS OF LEG WITH PAIN, BILATERAL: Primary | ICD-10-CM

## 2025-02-13 ENCOUNTER — LAB (OUTPATIENT)
Facility: HOSPITAL | Age: 45
End: 2025-02-13
Payer: COMMERCIAL

## 2025-02-13 DIAGNOSIS — I83.813 VARICOSE VEINS OF LEG WITH PAIN, BILATERAL: ICD-10-CM

## 2025-02-13 LAB
ABO GROUP BLD: NORMAL
ANION GAP SERPL CALCULATED.3IONS-SCNC: 7 MMOL/L (ref 5–15)
BASOPHILS # BLD AUTO: 0.06 10*3/MM3 (ref 0–0.2)
BASOPHILS NFR BLD AUTO: 0.5 % (ref 0–1.5)
BUN SERPL-MCNC: 15 MG/DL (ref 6–20)
BUN/CREAT SERPL: 11.5 (ref 7–25)
CALCIUM SPEC-SCNC: 9.8 MG/DL (ref 8.6–10.5)
CHLORIDE SERPL-SCNC: 103 MMOL/L (ref 98–107)
CO2 SERPL-SCNC: 29 MMOL/L (ref 22–29)
CREAT SERPL-MCNC: 1.31 MG/DL (ref 0.76–1.27)
DEPRECATED RDW RBC AUTO: 40.2 FL (ref 37–54)
EGFRCR SERPLBLD CKD-EPI 2021: 68.8 ML/MIN/1.73
EOSINOPHIL # BLD AUTO: 0.93 10*3/MM3 (ref 0–0.4)
EOSINOPHIL NFR BLD AUTO: 7 % (ref 0.3–6.2)
ERYTHROCYTE [DISTWIDTH] IN BLOOD BY AUTOMATED COUNT: 12.4 % (ref 12.3–15.4)
GLUCOSE SERPL-MCNC: 68 MG/DL (ref 65–99)
HCT VFR BLD AUTO: 46.1 % (ref 37.5–51)
HGB BLD-MCNC: 15.9 G/DL (ref 13–17.7)
IMM GRANULOCYTES # BLD AUTO: 0.04 10*3/MM3 (ref 0–0.05)
IMM GRANULOCYTES NFR BLD AUTO: 0.3 % (ref 0–0.5)
LYMPHOCYTES # BLD AUTO: 2.94 10*3/MM3 (ref 0.7–3.1)
LYMPHOCYTES NFR BLD AUTO: 22.1 % (ref 19.6–45.3)
MCH RBC QN AUTO: 30.7 PG (ref 26.6–33)
MCHC RBC AUTO-ENTMCNC: 34.5 G/DL (ref 31.5–35.7)
MCV RBC AUTO: 89 FL (ref 79–97)
MONOCYTES # BLD AUTO: 0.91 10*3/MM3 (ref 0.1–0.9)
MONOCYTES NFR BLD AUTO: 6.8 % (ref 5–12)
NEUTROPHILS NFR BLD AUTO: 63.3 % (ref 42.7–76)
NEUTROPHILS NFR BLD AUTO: 8.43 10*3/MM3 (ref 1.7–7)
NRBC BLD AUTO-RTO: 0 /100 WBC (ref 0–0.2)
PLATELET # BLD AUTO: 281 10*3/MM3 (ref 140–450)
PMV BLD AUTO: 11.3 FL (ref 6–12)
POTASSIUM SERPL-SCNC: 4.4 MMOL/L (ref 3.5–5.2)
RBC # BLD AUTO: 5.18 10*6/MM3 (ref 4.14–5.8)
RH BLD: POSITIVE
SODIUM SERPL-SCNC: 139 MMOL/L (ref 136–145)
WBC NRBC COR # BLD AUTO: 13.31 10*3/MM3 (ref 3.4–10.8)

## 2025-02-13 PROCEDURE — 86900 BLOOD TYPING SEROLOGIC ABO: CPT

## 2025-02-13 PROCEDURE — 80048 BASIC METABOLIC PNL TOTAL CA: CPT

## 2025-02-13 PROCEDURE — 85025 COMPLETE CBC W/AUTO DIFF WBC: CPT

## 2025-02-13 PROCEDURE — 86901 BLOOD TYPING SEROLOGIC RH(D): CPT

## 2025-02-13 PROCEDURE — 36415 COLL VENOUS BLD VENIPUNCTURE: CPT

## 2025-04-22 NOTE — PROGRESS NOTES
Chief Complaint        Nausea (Especially when he starts eating, he feels like he is going to vomit. ), Abdominal Pain (At times. ), Diarrhea (Altered bowels, dark red blood, rectal bleeding. ), Heartburn (Prevacid helps with reflux. ), and Weight Loss (30-40 pounds over the past 2 months. )    Patient or patient representative verbalized consent for the use of Ambient Listening during the visit with  RAFAL Watkins for chart documentation. 4/28/2025  11:02 EDT    History of Present Illness      Asad Sutton is a 44 y.o. male who presents to [facility] as a new patient   History of Present Illness  The patient is a 44-year-old male who presents for evaluation of significant weight loss, changes in bowel movements, and heartburn.    He reports an unexplained weight loss of approximately 30 to 40 pounds over the past month and a half. He has been experiencing nausea and occasional vomiting, particularly when starting to eat, which causes him to pause during meals to regain his composure before resuming. He is uncertain if any specific foods exacerbate his symptoms. Intermittent lower abdominal cramping is reported, but no difficulty swallowing. No new medications have been started in the past 2 months, and NSAIDs are not used.     A history of smoking marijuana since his early teens is noted, and he believes it may be contributing to his symptoms. Despite having a license for its use, he acknowledges potential health concerns. Recently, a decrease in appetite has been observed, a change from his usual increased appetite when using marijuana. An attempt to quit smoking marijuana for 9 to 10 months did not result in significant symptom improvement. Cigarette smoking was ceased 3 to 4 years ago. He is not currently on any anticoagulants or GLP-1 agonists. Previous consultations with a cardiologist were for routine check-ups.    A sensation of constipation is described, despite  not having difficulty with bowel movements. Bowel movements have increased in frequency to 8 or 9 times daily, with the initial few being loose, followed by straining. The consistency of stools varies, and dark blood has been observed in the stool, although they do not appear black. No fevers, chills, or night sweats are reported.    Heartburn and reflux are experienced, for which lansoprazole is taken and found beneficial.    SOCIAL HISTORY  Tobacco: Quit smoking cigarettes three or four years ago. Currently smokes marijuana daily.  Recreational Drugs: Smokes marijuana daily and has been smoking since 14 or 15 years old.      FAMILY HISTORY  - Negative for colon cancer in family history.    Most recent labs- 3/31/25          Results       Result Review :   The following data was reviewed by: RAFAL Watkins on 04/28/2025     CMP          2/13/2025    15:29   CMP   Glucose 68    BUN 15    Creatinine 1.31    EGFR 68.8    Sodium 139    Potassium 4.4    Chloride 103    Calcium 9.8    BUN/Creatinine Ratio 11.5    Anion Gap 7.0      CBC          2/13/2025    15:29   CBC   WBC 13.31    RBC 5.18    Hemoglobin 15.9    Hematocrit 46.1    MCV 89.0    MCH 30.7    MCHC 34.5    RDW 12.4    Platelets 281        Iron Profile   Iron   Date Value Ref Range Status   01/23/2020 129 70 - 180 ug/dL Final     TIBC   Date Value Ref Range Status   01/23/2020 355 245 - 450 ug/dL Final     Comment:     As of 10/15/03, the chemistry department began utilizing a Transferrin  assay. Data suggests that Transferrin is a better estimate of Total Iron  binding capacity than the TIBC assay. As a result the TIBC will now be a  calculated estimate from the measured Transferrin.       Iron Saturation   Date Value Ref Range Status   01/23/2020 36 20 - 55 % Final     Transferrin   Date Value Ref Range Status   01/23/2020 248.00 215.00 - 365.00 mg/dL Final     Ferritin   Ferritin   Date Value Ref Range Status   01/23/2020 114 30 - 300 ng/mL Final      Comment:     <10 ng/ml usually associated with Iron Deficiency Anemia.  Above normal range levels may be due to Hepatic and/or  Chronic Inflammatory Disease.              Results          Past Medical History       Past Medical History:   Diagnosis Date    Anxiety     Arthritis 12 years ago    GERD (gastroesophageal reflux disease)     Headache 1 ago    Hyperlipidemia     Subcutaneous mass of abdominal wall        Past Surgical History:   Procedure Laterality Date    BUNIONECTOMY      left, 2020    EXCISION LESION Left 07/29/2022    Procedure: Excision of subcutaneous mass from abdominal wall;  Surgeon: Anirudh Guillen MD;  Location: [facility];  Service: General;  Laterality: Left;    HAND SURGERY      VEIN SURGERY      [facility]- vein removed from leg         Current Outpatient Medications:     amitriptyline (ELAVIL) 10 MG tablet, TAKE 1 TO 2 BY MOUTH AT BEDTIME, Disp: , Rfl:     escitalopram (LEXAPRO) 20 MG tablet, Take 1 tablet by mouth Daily., Disp: , Rfl:     hydroCHLOROthiazide 12.5 MG tablet, Take 1 tablet by mouth Daily., Disp: , Rfl:     lansoprazole (PREVACID) 30 MG capsule, TAKE 1 CAPSULE BY MOUTH EVERY DAY, Disp: 90 capsule, Rfl: 0    Omega-3 Fatty Acids (fish oil) 1000 MG capsule capsule, Take 1 capsule by mouth Daily With Breakfast., Disp: , Rfl:     rosuvastatin (CRESTOR) 5 MG tablet, Take 1 tablet by mouth Every Night., Disp: 90 tablet, Rfl: 1    Testosterone Cypionate (DEPOTESTOTERONE CYPIONATE) 200 MG/ML injection, 1 mL., Disp: , Rfl:     Sod Picosulfate-Mag Ox-Cit Acd (Clenpiq) 10-3.5-12 MG-GM -GM/160ML solution, Take 175 mL by mouth 1 (One) Time for 1 dose. As directed by office., Disp: 350 mL, Rfl: 0     Allergies   Allergen Reactions    Penicillins Unknown - High Severity       Family History   Problem Relation Age of Onset    Hypertension Father     Hypertension Brother     Anxiety disorder Brother     Hyperlipidemia Brother     Diabetes Maternal Grandfather     Diabetes Paternal  "Grandfather     Cancer Neg Hx     Colon cancer Neg Hx         Social History     Social History Narrative    Not on file       Objective       Objective     Vital Signs:   /76 (BP Location: Left arm, Patient Position: Sitting, Cuff Size: Adult)   Pulse 62   Ht 198.1 cm (78\")   Wt 94.2 kg (207 lb 11.2 oz)   SpO2 100%   BMI 24.00 kg/m²     Body mass index is 24 kg/m².    Physical Exam  Constitutional:       Appearance: Normal appearance.   Pulmonary:      Effort: Pulmonary effort is normal.   Neurological:      General: No focal deficit present.      Mental Status: He is alert and oriented to person, place, and time.   Psychiatric:         Mood and Affect: Mood normal.         Behavior: Behavior normal.         Physical Exam  General: 44-year-old male, alert and oriented, no acute distress.  Abdomen: Mild lower abdominal cramping, no tenderness noted.               Assessment & Plan          Assessment and Plan    Diagnoses and all orders for this visit:    1. Nausea (Primary)  -     Case Request; Standing  -     Follow Anesthesia Guidelines / Protocol; Future  -     Verify NPO; Standing  -     Verify Bowel Prep Was Successful; Standing  -     Give Tap Water Enema If Bowel Prep Insufficient; Standing  -     Case Request    2. Generalized abdominal pain  -     Case Request; Standing  -     Follow Anesthesia Guidelines / Protocol; Future  -     Verify NPO; Standing  -     Verify Bowel Prep Was Successful; Standing  -     Give Tap Water Enema If Bowel Prep Insufficient; Standing  -     Case Request    3. Altered bowel habits  -     Case Request; Standing  -     Follow Anesthesia Guidelines / Protocol; Future  -     Verify NPO; Standing  -     Verify Bowel Prep Was Successful; Standing  -     Give Tap Water Enema If Bowel Prep Insufficient; Standing  -     Case Request    4. Diarrhea, unspecified type  -     Case Request; Standing  -     Follow Anesthesia Guidelines / Protocol; Future  -     Verify NPO; " Standing  -     Verify Bowel Prep Was Successful; Standing  -     Give Tap Water Enema If Bowel Prep Insufficient; Standing  -     Case Request    5. Rectal bleeding  -     Case Request; Standing  -     Follow Anesthesia Guidelines / Protocol; Future  -     Verify NPO; Standing  -     Verify Bowel Prep Was Successful; Standing  -     Give Tap Water Enema If Bowel Prep Insufficient; Standing  -     Case Request    6. Hematochezia  -     Case Request; Standing  -     Follow Anesthesia Guidelines / Protocol; Future  -     Verify NPO; Standing  -     Verify Bowel Prep Was Successful; Standing  -     Give Tap Water Enema If Bowel Prep Insufficient; Standing  -     Case Request    7. Weight loss  -     Case Request; Standing  -     Follow Anesthesia Guidelines / Protocol; Future  -     Verify NPO; Standing  -     Verify Bowel Prep Was Successful; Standing  -     Give Tap Water Enema If Bowel Prep Insufficient; Standing  -     Case Request    8. Gastroesophageal reflux disease, unspecified whether esophagitis present  -     Case Request; Standing  -     Follow Anesthesia Guidelines / Protocol; Future  -     Verify NPO; Standing  -     Verify Bowel Prep Was Successful; Standing  -     Give Tap Water Enema If Bowel Prep Insufficient; Standing  -     Case Request    9. Nausea and vomiting, unspecified vomiting type  -     Case Request; Standing  -     Follow Anesthesia Guidelines / Protocol; Future  -     Verify NPO; Standing  -     Verify Bowel Prep Was Successful; Standing  -     Give Tap Water Enema If Bowel Prep Insufficient; Standing  -     Case Request    10. History of smoking  -     Case Request; Standing  -     Follow Anesthesia Guidelines / Protocol; Future  -     Verify NPO; Standing  -     Verify Bowel Prep Was Successful; Standing  -     Give Tap Water Enema If Bowel Prep Insufficient; Standing  -     Case Request    11. Marijuana dependence  -     Case Request; Standing  -     Follow Anesthesia Guidelines /  Protocol; Future  -     Verify NPO; Standing  -     Verify Bowel Prep Was Successful; Standing  -     Give Tap Water Enema If Bowel Prep Insufficient; Standing  -     Case Request    Other orders  -     Sod Picosulfate-Mag Ox-Cit Acd (Clenpiq) 10-3.5-12 MG-GM -GM/160ML solution; Take 175 mL by mouth 1 (One) Time for 1 dose. As directed by office.  Dispense: 350 mL; Refill: 0          Assessment & Plan  1. Weight loss:  - Significant weight loss of 30-40 pounds over the past month and a half.  - Possible cannabinoid hyperemesis syndrome due to heavy marijuana use.  - Recommend EGD and colonoscopy to investigate potential causes, including bleeding from higher up in the GI system.  - Prescription for colon prep to be sent to the pharmacy.  - Follow a clear liquid diet the day before the procedure.  - Arrange for a  due to sedation requirements.    2. Bowel movement changes:  - Frequent bowel movements, approximately eight to nine times a day, with variability in stool consistency and occasional dark blood in the stool.  - No family history of colon cancer.  - Recommend EGD and colonoscopy to investigate potential causes, including colon polyps, lesions, ulcers, bacterial overgrowth, and abnormalities in the stomach area.  - Prescription for colon prep to be sent to the pharmacy.  - Follow a clear liquid diet the day before the procedure.  - Arrange for a  due to sedation requirements.    3. Heartburn:  - Currently taking lansoprazole for heartburn and finds it helpful.  - No changes to this medication recommended at this time.    I have recommended that the patient undergo further evaluation with a colonoscopy and EGD.  I have discussed this procedure in detail with the patient.  I have discussed the risks, benefits and alternatives.  I have discussed the risk of anesthesia, bleeding and perforation.  Patient understands these risks, benefits and alternatives and wishes to proceed.  Patient agreeable to  this plan and will call with any questions or concerns.             Follow Up       Follow Up   Return for Follow up after endoscopy in office.  Patient was given instructions and counseling regarding his condition or for health maintenance advice. Please see specific information pulled into the AVS if appropriate.

## 2025-04-22 NOTE — H&P (VIEW-ONLY)
Chief Complaint        Nausea (Especially when he starts eating, he feels like he is going to vomit. ), Abdominal Pain (At times. ), Diarrhea (Altered bowels, dark red blood, rectal bleeding. ), Heartburn (Prevacid helps with reflux. ), and Weight Loss (30-40 pounds over the past 2 months. )    Patient or patient representative verbalized consent for the use of Ambient Listening during the visit with  RAFAL Watkins for chart documentation. 4/28/2025  11:02 EDT    History of Present Illness      Asad Sutton is a 44 y.o. male who presents to [facility] as a new patient   History of Present Illness  The patient is a 44-year-old male who presents for evaluation of significant weight loss, changes in bowel movements, and heartburn.    He reports an unexplained weight loss of approximately 30 to 40 pounds over the past month and a half. He has been experiencing nausea and occasional vomiting, particularly when starting to eat, which causes him to pause during meals to regain his composure before resuming. He is uncertain if any specific foods exacerbate his symptoms. Intermittent lower abdominal cramping is reported, but no difficulty swallowing. No new medications have been started in the past 2 months, and NSAIDs are not used.     A history of smoking marijuana since his early teens is noted, and he believes it may be contributing to his symptoms. Despite having a license for its use, he acknowledges potential health concerns. Recently, a decrease in appetite has been observed, a change from his usual increased appetite when using marijuana. An attempt to quit smoking marijuana for 9 to 10 months did not result in significant symptom improvement. Cigarette smoking was ceased 3 to 4 years ago. He is not currently on any anticoagulants or GLP-1 agonists. Previous consultations with a cardiologist were for routine check-ups.    A sensation of constipation is described, despite  not having difficulty with bowel movements. Bowel movements have increased in frequency to 8 or 9 times daily, with the initial few being loose, followed by straining. The consistency of stools varies, and dark blood has been observed in the stool, although they do not appear black. No fevers, chills, or night sweats are reported.    Heartburn and reflux are experienced, for which lansoprazole is taken and found beneficial.    SOCIAL HISTORY  Tobacco: Quit smoking cigarettes three or four years ago. Currently smokes marijuana daily.  Recreational Drugs: Smokes marijuana daily and has been smoking since 14 or 15 years old.      FAMILY HISTORY  - Negative for colon cancer in family history.    Most recent labs- 3/31/25          Results       Result Review :   The following data was reviewed by: RAFAL Watkins on 04/28/2025     CMP          2/13/2025    15:29   CMP   Glucose 68    BUN 15    Creatinine 1.31    EGFR 68.8    Sodium 139    Potassium 4.4    Chloride 103    Calcium 9.8    BUN/Creatinine Ratio 11.5    Anion Gap 7.0      CBC          2/13/2025    15:29   CBC   WBC 13.31    RBC 5.18    Hemoglobin 15.9    Hematocrit 46.1    MCV 89.0    MCH 30.7    MCHC 34.5    RDW 12.4    Platelets 281        Iron Profile   Iron   Date Value Ref Range Status   01/23/2020 129 70 - 180 ug/dL Final     TIBC   Date Value Ref Range Status   01/23/2020 355 245 - 450 ug/dL Final     Comment:     As of 10/15/03, the chemistry department began utilizing a Transferrin  assay. Data suggests that Transferrin is a better estimate of Total Iron  binding capacity than the TIBC assay. As a result the TIBC will now be a  calculated estimate from the measured Transferrin.       Iron Saturation   Date Value Ref Range Status   01/23/2020 36 20 - 55 % Final     Transferrin   Date Value Ref Range Status   01/23/2020 248.00 215.00 - 365.00 mg/dL Final     Ferritin   Ferritin   Date Value Ref Range Status   01/23/2020 114 30 - 300 ng/mL Final      Comment:     <10 ng/ml usually associated with Iron Deficiency Anemia.  Above normal range levels may be due to Hepatic and/or  Chronic Inflammatory Disease.              Results          Past Medical History       Past Medical History:   Diagnosis Date    Anxiety     Arthritis 12 years ago    GERD (gastroesophageal reflux disease)     Headache 1 ago    Hyperlipidemia     Subcutaneous mass of abdominal wall        Past Surgical History:   Procedure Laterality Date    BUNIONECTOMY      left, 2020    EXCISION LESION Left 07/29/2022    Procedure: Excision of subcutaneous mass from abdominal wall;  Surgeon: Anirudh Guillen MD;  [place] [facility] [place]  Service: General;  Laterality: Left;    HAND SURGERY      VEIN SURGERY      [place]- vein removed from leg         Current Outpatient Medications:     amitriptyline (ELAVIL) 10 MG tablet, TAKE 1 TO 2 BY MOUTH AT BEDTIME, Disp: , Rfl:     escitalopram (LEXAPRO) 20 MG tablet, Take 1 tablet by mouth Daily., Disp: , Rfl:     hydroCHLOROthiazide 12.5 MG tablet, Take 1 tablet by mouth Daily., Disp: , Rfl:     lansoprazole (PREVACID) 30 MG capsule, TAKE 1 CAPSULE BY MOUTH EVERY DAY, Disp: 90 capsule, Rfl: 0    Omega-3 Fatty Acids (fish oil) 1000 MG capsule capsule, Take 1 capsule by mouth Daily With Breakfast., Disp: , Rfl:     rosuvastatin (CRESTOR) 5 MG tablet, Take 1 tablet by mouth Every Night., Disp: 90 tablet, Rfl: 1    Testosterone Cypionate (DEPOTESTOTERONE CYPIONATE) 200 MG/ML injection, 1 mL., Disp: , Rfl:     Sod Picosulfate-Mag Ox-Cit Acd (Clenpiq) 10-3.5-12 MG-GM -GM/160ML solution, Take 175 mL by mouth 1 (One) Time for 1 dose. As directed by office., Disp: 350 mL, Rfl: 0     Allergies   Allergen Reactions    Penicillins Unknown - High Severity       Family History   Problem Relation Age of Onset    Hypertension Father     Hypertension Brother     Anxiety disorder Brother     Hyperlipidemia Brother     Diabetes Maternal Grandfather     Diabetes Paternal  "Grandfather     Cancer Neg Hx     Colon cancer Neg Hx         Social History     Social History Narrative    Not on file       Objective       Objective     Vital Signs:   /76 (BP Location: Left arm, Patient Position: Sitting, Cuff Size: Adult)   Pulse 62   Ht 198.1 cm (78\")   Wt 94.2 kg (207 lb 11.2 oz)   SpO2 100%   BMI 24.00 kg/m²     Body mass index is 24 kg/m².    Physical Exam  Constitutional:       Appearance: Normal appearance.   Pulmonary:      Effort: Pulmonary effort is normal.   Neurological:      General: No focal deficit present.      Mental Status: He is alert and oriented to person, place, and time.   Psychiatric:         Mood and Affect: Mood normal.         Behavior: Behavior normal.         Physical Exam  General: 44-year-old male, alert and oriented, no acute distress.  Abdomen: Mild lower abdominal cramping, no tenderness noted.               Assessment & Plan          Assessment and Plan    Diagnoses and all orders for this visit:    1. Nausea (Primary)  -     Case Request; Standing  -     Follow Anesthesia Guidelines / Protocol; Future  -     Verify NPO; Standing  -     Verify Bowel Prep Was Successful; Standing  -     Give Tap Water Enema If Bowel Prep Insufficient; Standing  -     Case Request    2. Generalized abdominal pain  -     Case Request; Standing  -     Follow Anesthesia Guidelines / Protocol; Future  -     Verify NPO; Standing  -     Verify Bowel Prep Was Successful; Standing  -     Give Tap Water Enema If Bowel Prep Insufficient; Standing  -     Case Request    3. Altered bowel habits  -     Case Request; Standing  -     Follow Anesthesia Guidelines / Protocol; Future  -     Verify NPO; Standing  -     Verify Bowel Prep Was Successful; Standing  -     Give Tap Water Enema If Bowel Prep Insufficient; Standing  -     Case Request    4. Diarrhea, unspecified type  -     Case Request; Standing  -     Follow Anesthesia Guidelines / Protocol; Future  -     Verify NPO; " Standing  -     Verify Bowel Prep Was Successful; Standing  -     Give Tap Water Enema If Bowel Prep Insufficient; Standing  -     Case Request    5. Rectal bleeding  -     Case Request; Standing  -     Follow Anesthesia Guidelines / Protocol; Future  -     Verify NPO; Standing  -     Verify Bowel Prep Was Successful; Standing  -     Give Tap Water Enema If Bowel Prep Insufficient; Standing  -     Case Request    6. Hematochezia  -     Case Request; Standing  -     Follow Anesthesia Guidelines / Protocol; Future  -     Verify NPO; Standing  -     Verify Bowel Prep Was Successful; Standing  -     Give Tap Water Enema If Bowel Prep Insufficient; Standing  -     Case Request    7. Weight loss  -     Case Request; Standing  -     Follow Anesthesia Guidelines / Protocol; Future  -     Verify NPO; Standing  -     Verify Bowel Prep Was Successful; Standing  -     Give Tap Water Enema If Bowel Prep Insufficient; Standing  -     Case Request    8. Gastroesophageal reflux disease, unspecified whether esophagitis present  -     Case Request; Standing  -     Follow Anesthesia Guidelines / Protocol; Future  -     Verify NPO; Standing  -     Verify Bowel Prep Was Successful; Standing  -     Give Tap Water Enema If Bowel Prep Insufficient; Standing  -     Case Request    9. Nausea and vomiting, unspecified vomiting type  -     Case Request; Standing  -     Follow Anesthesia Guidelines / Protocol; Future  -     Verify NPO; Standing  -     Verify Bowel Prep Was Successful; Standing  -     Give Tap Water Enema If Bowel Prep Insufficient; Standing  -     Case Request    10. History of smoking  -     Case Request; Standing  -     Follow Anesthesia Guidelines / Protocol; Future  -     Verify NPO; Standing  -     Verify Bowel Prep Was Successful; Standing  -     Give Tap Water Enema If Bowel Prep Insufficient; Standing  -     Case Request    11. Marijuana dependence  -     Case Request; Standing  -     Follow Anesthesia Guidelines /  Protocol; Future  -     Verify NPO; Standing  -     Verify Bowel Prep Was Successful; Standing  -     Give Tap Water Enema If Bowel Prep Insufficient; Standing  -     Case Request    Other orders  -     Sod Picosulfate-Mag Ox-Cit Acd (Clenpiq) 10-3.5-12 MG-GM -GM/160ML solution; Take 175 mL by mouth 1 (One) Time for 1 dose. As directed by office.  Dispense: 350 mL; Refill: 0          Assessment & Plan  1. Weight loss:  - Significant weight loss of 30-40 pounds over the past month and a half.  - Possible cannabinoid hyperemesis syndrome due to heavy marijuana use.  - Recommend EGD and colonoscopy to investigate potential causes, including bleeding from higher up in the GI system.  - Prescription for colon prep to be sent to the pharmacy.  - Follow a clear liquid diet the day before the procedure.  - Arrange for a  due to sedation requirements.    2. Bowel movement changes:  - Frequent bowel movements, approximately eight to nine times a day, with variability in stool consistency and occasional dark blood in the stool.  - No family history of colon cancer.  - Recommend EGD and colonoscopy to investigate potential causes, including colon polyps, lesions, ulcers, bacterial overgrowth, and abnormalities in the stomach area.  - Prescription for colon prep to be sent to the pharmacy.  - Follow a clear liquid diet the day before the procedure.  - Arrange for a  due to sedation requirements.    3. Heartburn:  - Currently taking lansoprazole for heartburn and finds it helpful.  - No changes to this medication recommended at this time.    I have recommended that the patient undergo further evaluation with a colonoscopy and EGD.  I have discussed this procedure in detail with the patient.  I have discussed the risks, benefits and alternatives.  I have discussed the risk of anesthesia, bleeding and perforation.  Patient understands these risks, benefits and alternatives and wishes to proceed.  Patient agreeable to  this plan and will call with any questions or concerns.             Follow Up       Follow Up   Return for Follow up after endoscopy in office.  Patient was given instructions and counseling regarding his condition or for health maintenance advice. Please see specific information pulled into the AVS if appropriate.

## 2025-04-28 ENCOUNTER — OFFICE VISIT (OUTPATIENT)
Dept: GASTROENTEROLOGY | Facility: CLINIC | Age: 45
End: 2025-04-28
Payer: COMMERCIAL

## 2025-04-28 VITALS
BODY MASS INDEX: 24.03 KG/M2 | HEIGHT: 78 IN | HEART RATE: 62 BPM | OXYGEN SATURATION: 100 % | SYSTOLIC BLOOD PRESSURE: 128 MMHG | WEIGHT: 207.7 LBS | DIASTOLIC BLOOD PRESSURE: 76 MMHG

## 2025-04-28 DIAGNOSIS — K92.1 HEMATOCHEZIA: ICD-10-CM

## 2025-04-28 DIAGNOSIS — K62.5 RECTAL BLEEDING: ICD-10-CM

## 2025-04-28 DIAGNOSIS — R19.7 DIARRHEA, UNSPECIFIED TYPE: ICD-10-CM

## 2025-04-28 DIAGNOSIS — F12.20 MARIJUANA DEPENDENCE: ICD-10-CM

## 2025-04-28 DIAGNOSIS — R11.2 NAUSEA AND VOMITING, UNSPECIFIED VOMITING TYPE: ICD-10-CM

## 2025-04-28 DIAGNOSIS — R11.0 NAUSEA: Primary | ICD-10-CM

## 2025-04-28 DIAGNOSIS — K21.9 GASTROESOPHAGEAL REFLUX DISEASE, UNSPECIFIED WHETHER ESOPHAGITIS PRESENT: ICD-10-CM

## 2025-04-28 DIAGNOSIS — Z87.891 HISTORY OF SMOKING: ICD-10-CM

## 2025-04-28 DIAGNOSIS — R19.4 ALTERED BOWEL HABITS: ICD-10-CM

## 2025-04-28 DIAGNOSIS — R10.84 GENERALIZED ABDOMINAL PAIN: ICD-10-CM

## 2025-04-28 DIAGNOSIS — R63.4 WEIGHT LOSS: ICD-10-CM

## 2025-04-28 PROCEDURE — 99214 OFFICE O/P EST MOD 30 MIN: CPT | Performed by: NURSE PRACTITIONER

## 2025-04-28 RX ORDER — ESCITALOPRAM OXALATE 20 MG/1
20 TABLET ORAL DAILY
COMMUNITY

## 2025-04-28 RX ORDER — HYDROCHLOROTHIAZIDE 12.5 MG/1
12.5 TABLET ORAL DAILY
COMMUNITY

## 2025-04-28 RX ORDER — AMITRIPTYLINE HYDROCHLORIDE 10 MG/1
TABLET ORAL
COMMUNITY

## 2025-04-28 RX ORDER — SODIUM PICOSULFATE, MAGNESIUM OXIDE, AND ANHYDROUS CITRIC ACID 10; 3.5; 12 MG/160ML; G/160ML; G/160ML
175 LIQUID ORAL ONCE
Qty: 350 ML | Refills: 0 | Status: SHIPPED | OUTPATIENT
Start: 2025-04-28 | End: 2025-04-28

## 2025-04-30 ENCOUNTER — ANESTHESIA EVENT (OUTPATIENT)
Dept: GASTROENTEROLOGY | Facility: HOSPITAL | Age: 45
End: 2025-04-30
Payer: COMMERCIAL

## 2025-04-30 ENCOUNTER — TELEPHONE (OUTPATIENT)
Dept: GASTROENTEROLOGY | Facility: CLINIC | Age: 45
End: 2025-04-30
Payer: COMMERCIAL

## 2025-04-30 NOTE — TELEPHONE ENCOUNTER
ENDO RECONCILIATION  Verify source of procedure(s): Office visit Eileen Leonard NP  04/2025  If other, please list source:   TIME OUT-CONFIRM CORRECT PROCEDURE: EGD & Colonoscopy  Cardiology: none   Pulmonology: none  Blood thinner:  none  GLP-1: none  Additional DX/indication for procedure: Nausea, abd pain, altered bowel habits, diarrhea, rectal bleed, wt loss, gerd, n/v, hx smoking, marijuana dependence  Please include any other notes relevant to endo reconciliation:  N/A

## 2025-04-30 NOTE — ANESTHESIA PREPROCEDURE EVALUATION
Anesthesia Evaluation     Patient summary reviewed and Nursing notes reviewed   NPO Solid Status: > 8 hours  NPO Liquid Status: > 2 hours           Airway   Mallampati: I  TM distance: >3 FB  Neck ROM: full  No difficulty expected  Dental    (+) partials    Pulmonary - normal exam    breath sounds clear to auscultation  (+) a smoker Former,  Cardiovascular - normal exam  Exercise tolerance: excellent (>7 METS)    Rhythm: regular  Rate: normal    (+) hyperlipidemia      Neuro/Psych  (+) headaches, psychiatric history Anxiety  GI/Hepatic/Renal/Endo    (+) GERD, GI bleeding     Musculoskeletal     (+) neck pain  Abdominal  - normal exam    Bowel sounds: normal.   Substance History   (+) drug use (hx of chronic marijuana use)     OB/GYN negative ob/gyn ROS         Other   arthritis,     ROS/Med Hx Other: No recent EKG                    Anesthesia Plan    ASA 2     general   total IV anesthesia  (Patient understands anesthesia not responsible for dental damage. Risks explained including allergic reactions, BP, HR, O2 changes, aspiration, advanced airway placement. Pt verbalized understanding.)  intravenous induction     Anesthetic plan, risks, benefits, and alternatives have been provided, discussed and informed consent has been obtained with: patient.  Pre-procedure education provided  Plan discussed with CRNA.        CODE STATUS:

## 2025-05-01 ENCOUNTER — HOSPITAL ENCOUNTER (OUTPATIENT)
Facility: HOSPITAL | Age: 45
Setting detail: HOSPITAL OUTPATIENT SURGERY
Discharge: HOME | End: 2025-05-01
Attending: INTERNAL MEDICINE | Admitting: INTERNAL MEDICINE
Payer: COMMERCIAL

## 2025-05-01 ENCOUNTER — ANESTHESIA (OUTPATIENT)
Dept: GASTROENTEROLOGY | Facility: HOSPITAL | Age: 45
End: 2025-05-01
Payer: COMMERCIAL

## 2025-05-01 VITALS
WEIGHT: 194.89 LBS | BODY MASS INDEX: 22.55 KG/M2 | OXYGEN SATURATION: 97 % | SYSTOLIC BLOOD PRESSURE: 107 MMHG | HEART RATE: 64 BPM | RESPIRATION RATE: 20 BRPM | DIASTOLIC BLOOD PRESSURE: 69 MMHG | HEIGHT: 78 IN | TEMPERATURE: 97.1 F

## 2025-05-01 DIAGNOSIS — R11.2 NAUSEA AND VOMITING, UNSPECIFIED VOMITING TYPE: ICD-10-CM

## 2025-05-01 DIAGNOSIS — R10.84 GENERALIZED ABDOMINAL PAIN: ICD-10-CM

## 2025-05-01 DIAGNOSIS — R63.4 WEIGHT LOSS: ICD-10-CM

## 2025-05-01 DIAGNOSIS — K21.9 GASTROESOPHAGEAL REFLUX DISEASE, UNSPECIFIED WHETHER ESOPHAGITIS PRESENT: ICD-10-CM

## 2025-05-01 DIAGNOSIS — K92.1 HEMATOCHEZIA: ICD-10-CM

## 2025-05-01 DIAGNOSIS — Z87.891 HISTORY OF SMOKING: ICD-10-CM

## 2025-05-01 DIAGNOSIS — R11.0 NAUSEA: ICD-10-CM

## 2025-05-01 DIAGNOSIS — R19.4 ALTERED BOWEL HABITS: ICD-10-CM

## 2025-05-01 DIAGNOSIS — R19.7 DIARRHEA, UNSPECIFIED TYPE: ICD-10-CM

## 2025-05-01 DIAGNOSIS — K62.5 RECTAL BLEEDING: ICD-10-CM

## 2025-05-01 DIAGNOSIS — F12.20 MARIJUANA DEPENDENCE: ICD-10-CM

## 2025-05-01 PROCEDURE — 25010000002 LIDOCAINE PF 2% 2 % SOLUTION: Performed by: NURSE ANESTHETIST, CERTIFIED REGISTERED

## 2025-05-01 PROCEDURE — 88305 TISSUE EXAM BY PATHOLOGIST: CPT | Performed by: INTERNAL MEDICINE

## 2025-05-01 PROCEDURE — 25810000003 LACTATED RINGERS PER 1000 ML: Performed by: NURSE ANESTHETIST, CERTIFIED REGISTERED

## 2025-05-01 PROCEDURE — 25010000002 PROPOFOL 10 MG/ML EMULSION: Performed by: NURSE ANESTHETIST, CERTIFIED REGISTERED

## 2025-05-01 RX ORDER — LIDOCAINE HYDROCHLORIDE 20 MG/ML
INJECTION, SOLUTION EPIDURAL; INFILTRATION; INTRACAUDAL; PERINEURAL AS NEEDED
Status: DISCONTINUED | OUTPATIENT
Start: 2025-05-01 | End: 2025-05-01 | Stop reason: SURG

## 2025-05-01 RX ORDER — DEXMEDETOMIDINE HYDROCHLORIDE 100 UG/ML
INJECTION, SOLUTION INTRAVENOUS AS NEEDED
Status: DISCONTINUED | OUTPATIENT
Start: 2025-05-01 | End: 2025-05-01 | Stop reason: SURG

## 2025-05-01 RX ORDER — DICYCLOMINE HCL 20 MG
20 TABLET ORAL 3 TIMES DAILY PRN
Qty: 120 TABLET | Refills: 5 | Status: SHIPPED | OUTPATIENT
Start: 2025-05-01 | End: 2025-05-31

## 2025-05-01 RX ORDER — PROPOFOL 10 MG/ML
VIAL (ML) INTRAVENOUS AS NEEDED
Status: DISCONTINUED | OUTPATIENT
Start: 2025-05-01 | End: 2025-05-01 | Stop reason: SURG

## 2025-05-01 RX ORDER — SODIUM CHLORIDE, SODIUM LACTATE, POTASSIUM CHLORIDE, CALCIUM CHLORIDE 600; 310; 30; 20 MG/100ML; MG/100ML; MG/100ML; MG/100ML
30 INJECTION, SOLUTION INTRAVENOUS CONTINUOUS
Status: DISCONTINUED | OUTPATIENT
Start: 2025-05-01 | End: 2025-05-01 | Stop reason: HOSPADM

## 2025-05-01 RX ADMIN — SODIUM CHLORIDE, POTASSIUM CHLORIDE, SODIUM LACTATE AND CALCIUM CHLORIDE 30 ML/HR: 600; 310; 30; 20 INJECTION, SOLUTION INTRAVENOUS at 13:17

## 2025-05-01 RX ADMIN — DEXMEDETOMIDINE 10 MCG: 100 INJECTION, SOLUTION, CONCENTRATE INTRAVENOUS at 15:12

## 2025-05-01 RX ADMIN — PROPOFOL 250 MCG/KG/MIN: 10 INJECTION, EMULSION INTRAVENOUS at 15:02

## 2025-05-01 RX ADMIN — PROPOFOL 100 MG: 10 INJECTION, EMULSION INTRAVENOUS at 15:02

## 2025-05-01 RX ADMIN — LIDOCAINE HYDROCHLORIDE 50 MG: 20 INJECTION, SOLUTION INTRAVENOUS at 15:02

## 2025-05-01 NOTE — ANESTHESIA POSTPROCEDURE EVALUATION
Patient: Asad Sutton    Procedure Summary       Date: 05/01/25 Room / Location: [facility] 2 / [facility]    Anesthesia Start: 1458 Anesthesia Stop: 1527    Procedures:       ESOPHAGOGASTRODUODENOSCOPY with biopsies      COLONOSCOPY with cold snare polypectomy & random biopsies Diagnosis:       Nausea      Generalized abdominal pain      Altered bowel habits      Diarrhea, unspecified type      Rectal bleeding      Hematochezia      Weight loss      Gastroesophageal reflux disease, unspecified whether esophagitis present      Nausea and vomiting, unspecified vomiting type      History of smoking      Marijuana dependence      (Nausea [R11.0])      (Generalized abdominal pain [R10.84])      (Altered bowel habits [R19.4])      (Diarrhea, unspecified type [R19.7])      (Rectal bleeding [K62.5])      (Hematochezia [K92.1])      (Weight loss [R63.4])      (Gastroesophageal reflux disease, unspecified whether esophagitis present [K21.9])      (Nausea and vomiting, unspecified vomiting type [R11.2])      (History of smoking [Z87.891])      (Marijuana dependence [F12.20])    Surgeons: Huey Stevenson MD Provider: Rosy Ibanez CRNA    Anesthesia Type: general ASA Status: 2            Anesthesia Type: general    Vitals  Vitals Value Taken Time   BP 98/68 05/01/25 15:38   Temp 36.3 °C (97.3 °F) 05/01/25 15:27   Pulse 76 05/01/25 15:42   Resp 18 05/01/25 15:37   SpO2 97 % 05/01/25 15:42   Vitals shown include unfiled device data.        Post Anesthesia Care and Evaluation    Post-procedure mental status: acceptable.  Pain management: satisfactory to patient    Airway patency: patent  Anesthetic complications: No anesthetic complications    Cardiovascular status: acceptable  Respiratory status: acceptable    Comments: Per chart review

## 2025-07-02 ENCOUNTER — OFFICE VISIT (OUTPATIENT)
Dept: ORTHOPEDIC SURGERY | Facility: CLINIC | Age: 45
End: 2025-07-02
Payer: COMMERCIAL

## 2025-07-02 VITALS
HEART RATE: 55 BPM | BODY MASS INDEX: 23.14 KG/M2 | HEIGHT: 78 IN | DIASTOLIC BLOOD PRESSURE: 87 MMHG | WEIGHT: 200 LBS | SYSTOLIC BLOOD PRESSURE: 129 MMHG | OXYGEN SATURATION: 98 %

## 2025-07-02 DIAGNOSIS — M18.11 OSTEOARTHRITIS OF CARPOMETACARPAL (CMC) JOINT OF RIGHT THUMB, UNSPECIFIED OSTEOARTHRITIS TYPE: ICD-10-CM

## 2025-07-02 DIAGNOSIS — M79.641 RIGHT HAND PAIN: Primary | ICD-10-CM

## 2025-07-02 NOTE — PROGRESS NOTES
"Chief Complaint  Initial Evaluation of the Right Hand       Subjective      Asad Sutton presents to [facility] for an evaluation  of his right hand. His right hand has been bothering him for several months but has gradually gotten worse. He denies any specific injury, trauma, falls or prior surgery to his right hand. He locates his pain to the base of his right thumb.     Allergies   Allergen Reactions    Penicillins Unknown - High Severity        Social History     Socioeconomic History    Marital status:    Tobacco Use    Smoking status: Former     Current packs/day: 0.00     Average packs/day: 2.0 packs/day for 20.0 years (40.0 ttl pk-yrs)     Types: Cigarettes     Start date: 2001     Quit date: 2021     Years since quittin.0     Passive exposure: Past    Smokeless tobacco: Former     Types: Chew   Vaping Use    Vaping status: Never Used   Substance and Sexual Activity    Alcohol use: Never    Drug use: Yes     Types: Marijuana     Comment: DAILY    Sexual activity: Yes     Partners: Female     Birth control/protection: None        I reviewed the patient's chief complaint, history of present illness, review of systems, past medical history, surgical history, family history, social history, medications, and allergy list.     Review of Systems     Constitutional: Denies fevers, chills, weight loss  Cardiovascular: Denies chest pain, shortness of breath  Skin: Denies rashes, acute skin changes  Neurologic: Denies headache, loss of consciousness  MSK: right hand pain       Vital Signs:   /87   Pulse 55   Ht 198.1 cm (77.99\")   Wt 90.7 kg (200 lb)   SpO2 98%   BMI 23.12 kg/m²          Physical Exam  General: Alert. No acute distress    Ortho Exam        Right upper extremity: non tender to the first dorsal compartment, negative  finkelstein, negative  compression and Tinel's, tender to the thumb CMC, positive  grind test, full finger range of motion, " tender to the thumb IP, neurovascularly intact, positive  pulses, sensation intact to the medial, radial and ulnar nerve       Procedures        Imaging Results (Most Recent)       Procedure Component Value Units Date/Time    XR Hand 2 View Right - In process [[id number]] Resulted: 07/02/25 0910     Updated: 07/02/25 0913    This result has not been signed. Information might be incomplete.               Result Review :     X-Ray Report:  Right hand  X-Ray  Indication: Evaluation of right hand pain   AP/Lateral view(s)  Findings: mild degenerative changes to the right thumb CMC, no acute fracture  Prior studies available for comparison: no       No results found.           Assessment and Plan     Diagnoses and all orders for this visit:    1. Right hand pain (Primary)  -     XR Hand 2 View Right    2. Osteoarthritis of carpometacarpal (CMC) joint of right thumb, unspecified osteoarthritis type      The patient presents here today for an evaluation  of his right hand. X-rays were obtained in the office today and these were reviewed today.     Discussed the risks and benefits of a right thumb and IP today in the office. Patient expressed understanding and wishes to hold off at this time. He will call if and when he would like to proceed.     Thumb a preen brace given to the patient today.     Home exercises given today and will continue over the counter medications for pain control.     Call or return if worsening symptoms.    Follow Up     PRN     Patient was given instructions and counseling regarding his condition or for health maintenance advice. Please see specific information pulled into the AVS if appropriate.     Scribed for Shant Eden MD by Galilea Savage.  07/02/25   09:31 EDT      I have personally performed the services described in this document as scribed by the above individual and it is both accurate and complete. Shant Eden MD 07/02/25

## 2025-07-30 ENCOUNTER — TELEPHONE (OUTPATIENT)
Dept: GASTROENTEROLOGY | Facility: CLINIC | Age: 45
End: 2025-07-30
Payer: COMMERCIAL

## 2025-07-30 NOTE — TELEPHONE ENCOUNTER
Hub staff attempted to follow warm transfer process and was unsuccessful     Caller: Asad Sutton    Relationship to patient: Self    Best call back number: [phone]    Patient is needing: WHEN PATIENT COUGHS THERE IS A BIG KNOT THAT POPS OUT ON THE LEFT SIDE OF HIS STOMACH IS VERY PAINFUL WOULD LIKE TO BE SEEN ASAP PLEASE CALL AND ADVISE

## 2025-07-31 NOTE — TELEPHONE ENCOUNTER
Patient called back to check status of his request for clinical review of his symptoms. Advised clinical staff will review his sx and reach out to him to discuss. Patient voiced understanding.

## 2025-08-11 ENCOUNTER — PREP FOR SURGERY (OUTPATIENT)
Dept: OTHER | Facility: HOSPITAL | Age: 45
End: 2025-08-11
Payer: COMMERCIAL

## 2025-08-11 DIAGNOSIS — K40.90 LEFT INGUINAL HERNIA: Primary | ICD-10-CM

## 2025-08-20 ENCOUNTER — ANESTHESIA EVENT (OUTPATIENT)
Dept: PERIOP | Facility: HOSPITAL | Age: 45
End: 2025-08-20
Payer: COMMERCIAL

## 2025-08-21 ENCOUNTER — ANESTHESIA (OUTPATIENT)
Dept: PERIOP | Facility: HOSPITAL | Age: 45
End: 2025-08-21
Payer: COMMERCIAL

## 2025-08-21 ENCOUNTER — HOSPITAL ENCOUNTER (OUTPATIENT)
Facility: HOSPITAL | Age: 45
Setting detail: HOSPITAL OUTPATIENT SURGERY
Discharge: HOME | End: 2025-08-21
Attending: SURGERY | Admitting: SURGERY
Payer: COMMERCIAL

## 2025-08-21 PROBLEM — K40.90 LEFT INGUINAL HERNIA: Status: RESOLVED | Noted: 2025-08-11 | Resolved: 2025-08-21

## 2025-08-21 PROCEDURE — 25010000002 SUGAMMADEX 200 MG/2ML SOLUTION: Performed by: NURSE ANESTHETIST, CERTIFIED REGISTERED

## 2025-08-21 PROCEDURE — 25010000002 ONDANSETRON PER 1 MG: Performed by: NURSE ANESTHETIST, CERTIFIED REGISTERED

## 2025-08-21 PROCEDURE — 25010000002 CEFAZOLIN PER 500 MG: Performed by: SURGERY

## 2025-08-21 PROCEDURE — 25010000002 DEXAMETHASONE PER 1 MG: Performed by: NURSE ANESTHETIST, CERTIFIED REGISTERED

## 2025-08-21 PROCEDURE — 25010000002 KETOROLAC TROMETHAMINE PER 15 MG: Performed by: NURSE ANESTHETIST, CERTIFIED REGISTERED

## 2025-08-21 PROCEDURE — 25010000002 LIDOCAINE PF 2% 2 % SOLUTION: Performed by: NURSE ANESTHETIST, CERTIFIED REGISTERED

## 2025-08-21 PROCEDURE — 25010000002 PROPOFOL 10 MG/ML EMULSION: Performed by: NURSE ANESTHETIST, CERTIFIED REGISTERED

## 2025-08-21 PROCEDURE — 25010000002 FENTANYL CITRATE (PF) 50 MCG/ML SOLUTION: Performed by: NURSE ANESTHETIST, CERTIFIED REGISTERED

## 2025-08-21 RX ORDER — PROPOFOL 10 MG/ML
VIAL (ML) INTRAVENOUS AS NEEDED
Status: DISCONTINUED | OUTPATIENT
Start: 2025-08-21 | End: 2025-08-21 | Stop reason: SURG

## 2025-08-21 RX ORDER — LIDOCAINE HYDROCHLORIDE 20 MG/ML
INJECTION, SOLUTION EPIDURAL; INFILTRATION; INTRACAUDAL; PERINEURAL AS NEEDED
Status: DISCONTINUED | OUTPATIENT
Start: 2025-08-21 | End: 2025-08-21 | Stop reason: SURG

## 2025-08-21 RX ORDER — ROCURONIUM BROMIDE 10 MG/ML
INJECTION, SOLUTION INTRAVENOUS AS NEEDED
Status: DISCONTINUED | OUTPATIENT
Start: 2025-08-21 | End: 2025-08-21 | Stop reason: SURG

## 2025-08-21 RX ORDER — DEXAMETHASONE SODIUM PHOSPHATE 4 MG/ML
INJECTION, SOLUTION INTRA-ARTICULAR; INTRALESIONAL; INTRAMUSCULAR; INTRAVENOUS; SOFT TISSUE AS NEEDED
Status: DISCONTINUED | OUTPATIENT
Start: 2025-08-21 | End: 2025-08-21 | Stop reason: SURG

## 2025-08-21 RX ORDER — DEXMEDETOMIDINE HYDROCHLORIDE 100 UG/ML
INJECTION, SOLUTION INTRAVENOUS AS NEEDED
Status: DISCONTINUED | OUTPATIENT
Start: 2025-08-21 | End: 2025-08-21 | Stop reason: SURG

## 2025-08-21 RX ORDER — FENTANYL CITRATE 50 UG/ML
INJECTION, SOLUTION INTRAMUSCULAR; INTRAVENOUS AS NEEDED
Status: DISCONTINUED | OUTPATIENT
Start: 2025-08-21 | End: 2025-08-21 | Stop reason: SURG

## 2025-08-21 RX ORDER — ONDANSETRON 2 MG/ML
INJECTION INTRAMUSCULAR; INTRAVENOUS AS NEEDED
Status: DISCONTINUED | OUTPATIENT
Start: 2025-08-21 | End: 2025-08-21 | Stop reason: SURG

## 2025-08-21 RX ORDER — KETOROLAC TROMETHAMINE 30 MG/ML
INJECTION, SOLUTION INTRAMUSCULAR; INTRAVENOUS AS NEEDED
Status: DISCONTINUED | OUTPATIENT
Start: 2025-08-21 | End: 2025-08-21 | Stop reason: SURG

## 2025-08-21 RX ADMIN — ONDANSETRON 4 MG: 2 INJECTION, SOLUTION INTRAMUSCULAR; INTRAVENOUS at 08:41

## 2025-08-21 RX ADMIN — ROCURONIUM BROMIDE 20 MG: 10 INJECTION, SOLUTION INTRAVENOUS at 08:22

## 2025-08-21 RX ADMIN — FENTANYL CITRATE 50 MCG: 50 INJECTION, SOLUTION INTRAMUSCULAR; INTRAVENOUS at 07:53

## 2025-08-21 RX ADMIN — ROCURONIUM BROMIDE 70 MG: 10 INJECTION, SOLUTION INTRAVENOUS at 07:20

## 2025-08-21 RX ADMIN — DEXMEDETOMIDINE 10 MCG: 100 INJECTION, SOLUTION INTRAVENOUS at 08:41

## 2025-08-21 RX ADMIN — SUGAMMADEX 200 MG: 100 INJECTION, SOLUTION INTRAVENOUS at 08:57

## 2025-08-21 RX ADMIN — FENTANYL CITRATE 150 MCG: 50 INJECTION, SOLUTION INTRAMUSCULAR; INTRAVENOUS at 07:20

## 2025-08-21 RX ADMIN — KETOROLAC TROMETHAMINE 30 MG: 30 INJECTION INTRAMUSCULAR; INTRAVENOUS at 08:52

## 2025-08-21 RX ADMIN — PROPOFOL 200 MG: 10 INJECTION, EMULSION INTRAVENOUS at 07:20

## 2025-08-21 RX ADMIN — DEXMEDETOMIDINE 20 MCG: 100 INJECTION, SOLUTION INTRAVENOUS at 08:29

## 2025-08-21 RX ADMIN — LIDOCAINE HYDROCHLORIDE 100 MG: 20 INJECTION, SOLUTION EPIDURAL; INFILTRATION; INTRACAUDAL; PERINEURAL at 07:20

## 2025-08-21 RX ADMIN — DEXAMETHASONE SODIUM PHOSPHATE 8 MG: 4 INJECTION, SOLUTION INTRAMUSCULAR; INTRAVENOUS at 07:31

## 2025-08-21 RX ADMIN — SODIUM CHLORIDE 2000 MG: 9 INJECTION, SOLUTION INTRAVENOUS at 07:25

## 2025-08-26 ENCOUNTER — TELEPHONE (OUTPATIENT)
Dept: SURGERY | Facility: CLINIC | Age: 45
End: 2025-08-26
Payer: COMMERCIAL

## (undated) DEVICE — ELECTRD BLD EDGE COAT 3IN

## (undated) DEVICE — CONN JET HYDRA H20 AUXILIARY DISP

## (undated) DEVICE — THE SINGLE USE ETRAP – POLYP TRAP IS USED FOR SUCTION RETRIEVAL OF ENDOSCOPICALLY REMOVED POLYPS.: Brand: ETRAP

## (undated) DEVICE — SINGLE-USE BIOPSY FORCEPS: Brand: RADIAL JAW 4

## (undated) DEVICE — GOWN,REINFORCE,POLY,SIRUS,BREATH SLV,XLG: Brand: MEDLINE

## (undated) DEVICE — APPL CHLORAPREP HI/LITE 26ML ORNG

## (undated) DEVICE — GLV SURG BIOGEL LTX PF 7 1/2

## (undated) DEVICE — BLCK/BITE BLOX WO/DENTL/RIM W/STRAP 54F

## (undated) DEVICE — PENCL E/S SMOKEEVAC W/TELESCP CANN

## (undated) DEVICE — LINER SURG CANSTR SXN S/RIGD 1500CC

## (undated) DEVICE — SOL IRRG H2O PL/BG 1000ML STRL

## (undated) DEVICE — DEFENDO AIR WATER SUCTION AND BIOPSY VALVE KIT FOR  OLYMPUS: Brand: DEFENDO AIR/WATER/SUCTION AND BIOPSY VALVE

## (undated) DEVICE — ADHS SKIN SURG TISS VISC PREMIERPRO EXOFIN HI/VISC FAST/DRY

## (undated) DEVICE — THE STERILE LIGHT HANDLE COVER IS USED WITH STERIS SURGICAL LIGHTING AND VISUALIZATION SYSTEMS.

## (undated) DEVICE — MAJOR-LF: Brand: MEDLINE INDUSTRIES, INC.

## (undated) DEVICE — SOL IRR NACL 0.9PCT BT 1000ML

## (undated) DEVICE — 3M™ STERI-STRIP™ REINFORCED ADHESIVE SKIN CLOSURES, R1547, 1/2 IN X 4 IN (12 MM X 100 MM), 6 STRIPS/ENVELOPE: Brand: 3M™ STERI-STRIP™

## (undated) DEVICE — SOLIDIFIER LIQLOC PLS 1500CC BT

## (undated) DEVICE — SUT VIC 3/0 SH 27IN J416H

## (undated) DEVICE — INTENDED FOR TISSUE SEPARATION, AND OTHER PROCEDURES THAT REQUIRE A SHARP SURGICAL BLADE TO PUNCTURE OR CUT.: Brand: BARD-PARKER ® CARBON RIB-BACK BLADES

## (undated) DEVICE — Device

## (undated) DEVICE — SUT MNCRYL 4/0 PS2 18 IN

## (undated) DEVICE — SNAR POLYP CAPTIFLEX XS/OVL 11X2.4MM 240CM 1P/U